# Patient Record
Sex: MALE | Race: WHITE | NOT HISPANIC OR LATINO | Employment: OTHER | URBAN - METROPOLITAN AREA
[De-identification: names, ages, dates, MRNs, and addresses within clinical notes are randomized per-mention and may not be internally consistent; named-entity substitution may affect disease eponyms.]

---

## 2018-08-29 ENCOUNTER — TELEPHONE (OUTPATIENT)
Dept: GASTROENTEROLOGY | Facility: CLINIC | Age: 53
End: 2018-08-29

## 2018-09-03 ENCOUNTER — PREP FOR SURGERY (OUTPATIENT)
Dept: OTHER | Facility: HOSPITAL | Age: 53
End: 2018-09-03

## 2018-09-03 DIAGNOSIS — Z12.11 SCREENING FOR COLON CANCER: Primary | ICD-10-CM

## 2018-09-06 PROBLEM — Z12.11 SCREENING FOR COLON CANCER: Status: ACTIVE | Noted: 2018-09-06

## 2018-10-19 ENCOUNTER — ANESTHESIA (OUTPATIENT)
Dept: GASTROENTEROLOGY | Facility: HOSPITAL | Age: 53
End: 2018-10-19

## 2018-10-19 ENCOUNTER — ANESTHESIA EVENT (OUTPATIENT)
Dept: GASTROENTEROLOGY | Facility: HOSPITAL | Age: 53
End: 2018-10-19

## 2018-10-19 ENCOUNTER — HOSPITAL ENCOUNTER (OUTPATIENT)
Facility: HOSPITAL | Age: 53
Setting detail: HOSPITAL OUTPATIENT SURGERY
Discharge: HOME | End: 2018-10-19
Attending: INTERNAL MEDICINE | Admitting: INTERNAL MEDICINE

## 2018-10-19 VITALS
OXYGEN SATURATION: 96 % | HEIGHT: 74 IN | HEART RATE: 71 BPM | WEIGHT: 231.56 LBS | SYSTOLIC BLOOD PRESSURE: 114 MMHG | TEMPERATURE: 97.9 F | DIASTOLIC BLOOD PRESSURE: 56 MMHG | BODY MASS INDEX: 29.72 KG/M2 | RESPIRATION RATE: 18 BRPM

## 2018-10-19 DIAGNOSIS — Z12.11 SCREENING FOR COLON CANCER: ICD-10-CM

## 2018-10-19 LAB — GLUCOSE BLDC GLUCOMTR-MCNC: 95 MG/DL (ref 70–130)

## 2018-10-19 PROCEDURE — 82962 GLUCOSE BLOOD TEST: CPT

## 2018-10-19 PROCEDURE — 45385 COLONOSCOPY W/LESION REMOVAL: CPT | Performed by: INTERNAL MEDICINE

## 2018-10-19 PROCEDURE — 25010000002 PROPOFOL 10 MG/ML EMULSION: Performed by: ANESTHESIOLOGY

## 2018-10-19 PROCEDURE — 45380 COLONOSCOPY AND BIOPSY: CPT | Performed by: INTERNAL MEDICINE

## 2018-10-19 PROCEDURE — 88305 TISSUE EXAM BY PATHOLOGIST: CPT | Performed by: INTERNAL MEDICINE

## 2018-10-19 RX ORDER — SODIUM CHLORIDE, SODIUM LACTATE, POTASSIUM CHLORIDE, CALCIUM CHLORIDE 600; 310; 30; 20 MG/100ML; MG/100ML; MG/100ML; MG/100ML
1000 INJECTION, SOLUTION INTRAVENOUS CONTINUOUS
Status: DISCONTINUED | OUTPATIENT
Start: 2018-10-19 | End: 2018-10-19 | Stop reason: HOSPADM

## 2018-10-19 RX ORDER — PROPOFOL 10 MG/ML
VIAL (ML) INTRAVENOUS AS NEEDED
Status: DISCONTINUED | OUTPATIENT
Start: 2018-10-19 | End: 2018-10-19 | Stop reason: SURG

## 2018-10-19 RX ORDER — LIDOCAINE HYDROCHLORIDE 20 MG/ML
INJECTION, SOLUTION INFILTRATION; PERINEURAL AS NEEDED
Status: DISCONTINUED | OUTPATIENT
Start: 2018-10-19 | End: 2018-10-19 | Stop reason: SURG

## 2018-10-19 RX ADMIN — SODIUM CHLORIDE, POTASSIUM CHLORIDE, SODIUM LACTATE AND CALCIUM CHLORIDE 1000 ML: 600; 310; 30; 20 INJECTION, SOLUTION INTRAVENOUS at 08:39

## 2018-10-19 RX ADMIN — PROPOFOL 200 MG: 10 INJECTION, EMULSION INTRAVENOUS at 09:55

## 2018-10-19 RX ADMIN — LIDOCAINE HYDROCHLORIDE 100 MG: 20 INJECTION, SOLUTION INFILTRATION; PERINEURAL at 09:40

## 2018-10-19 RX ADMIN — PROPOFOL 200 MG: 10 INJECTION, EMULSION INTRAVENOUS at 09:40

## 2018-10-19 NOTE — H&P
Patient Care Team:  Fawad Gutierrez MD as PCP - General (Internal Medicine)  George Fletcher DPM as PCP - Claims Attributed    CHIEF COMPLAINT: Screening for Colon Cancer    HISTORY OF PRESENT ILLNESS:    No previous exam and no family history      Past Medical History:   Diagnosis Date   • Cerebral palsy (CMS/HCC)    • Diabetes mellitus (CMS/HCC)    • Hyperlipidemia    • Hypertension    • Seasonal allergies    • Thrombocytopenia (CMS/HCC)     mild, chronic, and stable   • Vitamin D deficiency      History reviewed. No pertinent surgical history.  Family History   Problem Relation Age of Onset   • Hemochromatosis Father      Social History   Substance Use Topics   • Smoking status: Never Smoker   • Smokeless tobacco: Not on file   • Alcohol use No     Prescriptions Prior to Admission   Medication Sig Dispense Refill Last Dose   • atorvastatin (LIPITOR) 20 MG tablet Take 20 mg by mouth daily.   10/19/2018 at Unknown time   • cholecalciferol (VITAMIN D3) 1000 UNITS tablet Take 1,000 Units by mouth daily.   10/18/2018 at Unknown time   • FLUoxetine (PROzac) 10 MG capsule Take 10 mg by mouth Daily.   10/18/2018 at Unknown time   • fluticasone (VERAMYST) 27.5 MCG/SPRAY nasal spray 2 sprays into each nostril daily.   10/19/2018 at Unknown time   • levocetirizine (XYZAL) 5 MG tablet Take 5 mg by mouth Every Evening.   10/18/2018 at Unknown time   • polyethylene glycol (MIRALAX) pack packet Take 17 g by mouth Daily. 14 each 0 10/18/2018 at Unknown time   • sodium phosphates (OSMOPREP) 1.102-0.398 g tablet tablet Starting at 3:00pm -4 tabs every 15 min for 5 doses (20 tabs), second dosing starting at 11:00pm 4 tabs every 15 min. For 2 doses (8 tabs) 28 tablet 0 10/18/2018 at Unknown time     Allergies:  Patient has no known allergies.    REVIEW OF SYSTEMS:  Please see the above history of present illness for pertinent positives and negatives.  The remainder of the patient's systems have been reviewed and are  "negative.     Vital Signs  Temp:  [98 °F (36.7 °C)] 98 °F (36.7 °C)  Heart Rate:  [82] 82  Resp:  [20] 20  BP: (142)/(80) 142/80    Flowsheet Rows      First Filed Value   Admission Height  188 cm (74\") Documented at 10/19/2018 0828   Admission Weight  105 kg (231 lb 9 oz) Documented at 10/19/2018 0811           Physical Exam:  Physical Exam   Constitutional: Patient appears well-developed and well-nourished and in no acute distress   HEENT:   Head: Normocephalic and atraumatic.   Eyes:  Pupils are equal, round, and reactive to light. EOM are intact. Sclera are anicteric and non-injected.  Mouth and Throat: Patient has moist mucous membranes. Oropharynx is clear of any erythema or exudate.     Neck: Neck supple. No JVD present. No thyromegaly present. No lymphadenopathy present.  Cardiovascular: Regular rate, regular rhythm, S1 normal and S2 normal.  Exam reveals no gallop and no friction rub.  No murmur heard.  Pulmonary/Chest: Lungs are clear to auscultation bilaterally. No respiratory distress. No wheezes. No rhonchi. No rales.   Abdominal: Soft. Bowel sounds are normal. No distension and no mass. There is no hepatosplenomegaly. There is no tenderness.   Musculoskeletal: Normal Muscle tone  Extremities: No edema. Pulses are palpable in all 4 extremities.  Neurological: Patient is alert and oriented to person, place, and time. Cranial nerves II-XII are grossly intact with no focal deficits.  Skin: Skin is warm. No rash noted. Nails show no clubbing.  No cyanosis or erythema.    Debilities/Disabilities Identified: None  Emotional Behavior: Appropriate     Results Review:    I reviewed the patient's new clinical results.  Lab Results (most recent)     Procedure Component Value Units Date/Time    POC Glucose Once [[id number]]  (Normal) Collected:  10/19/18 0840    Specimen:  Blood Updated:  10/19/18 0852     Glucose 95 mg/dL     Narrative:       Meter: [id number] : [id number] Brendon LUQUE RN          Imaging " Results (most recent)     None        reviewed    ECG/EMG Results (most recent)     None        reviewed    Assessment/Plan     Screening for Colon Cancer/ Colonoscopy    I discussed the patients findings and my recommendations with patient.     Manuel Clarke MD  10/19/18  9:29 AM    Time: 10 min prior to procedure.

## 2018-10-19 NOTE — BRIEF OP NOTE
COLONOSCOPY  Progress Note    Gavino Samaniego Jr.  10/19/2018    Pre-op Diagnosis:   Screening for colon cancer [Z12.11]       Post-Op Diagnosis Codes:     * Screening for colon cancer [Z12.11]     * Colon polyp [K63.5]    Procedure/CPT® Codes:      Procedure(s):  COLONOSCOPY TO CECUM AND INTO TERMINAL ILEUM WITH COLD BIOPSY POLYPECTOMIES AND COLD SNARE POLYPECTOMIES    Surgeon(s):  Manuel Clarke MD    Anesthesia: Monitor Anesthesia Care    Staff:   Endo Technician: Loni Felton  Endo Nurse: Corazon Tran RN    Estimated Blood Loss: none    Urine Voided: * No values recorded between 10/19/2018  9:33 AM and 10/19/2018 10:13 AM *    Specimens:                ID Type Source Tests Collected by Time   A : TRANSVERSE COLON POLYPS Polyp Large Intestine, Transverse Colon TISSUE PATHOLOGY EXAM VinceManuel nazario MD 10/19/2018 0947   B : CECAL POLYP Polyp Large Intestine, Cecum TISSUE PATHOLOGY EXAM Manuel Clarke MD 10/19/2018 0950   C : ASCENDING COLON POLYPS Polyp Large Intestine, Right / Ascending Colon TISSUE PATHOLOGY EXAM Manuel Clarke MD 10/19/2018 0955   D : DESCENDING COLON POLYPS Polyp Large Intestine, Left / Descending Colon TISSUE PATHOLOGY EXAM Manuel Clarke MD 10/19/2018 1001   E : SIGMOID COLON POLYPS Polyp Large Intestine, Sigmoid Colon TISSUE PATHOLOGY EXAM Manuel Clarke MD 10/19/2018 1005   F : RECTAL POLYPS Polyp Large Intestine, Rectum TISSUE PATHOLOGY EXAM Manuel Clarke MD 10/19/2018 1008         Drains:      Findings: Colon to TI good Prep  Polyps (13) Cold Snare/Biopsy    Complications: None      Manuel Clarke MD     Date: 10/19/2018  Time: 10:14 AM

## 2018-10-19 NOTE — ANESTHESIA POSTPROCEDURE EVALUATION
Patient: Gavino Samaniego Jr.    Procedure Summary     Date:  10/19/18 Room / Location:  [facility] ENDOSCOPY 4 / [facility] ENDOSCOPY    Anesthesia Start:  0935 Anesthesia Stop:  1019    Procedure:  COLONOSCOPY TO CECUM AND INTO TERMINAL ILEUM WITH COLD BIOPSY POLYPECTOMIES AND COLD SNARE POLYPECTOMIES (N/A ) Diagnosis:       Screening for colon cancer      Colon polyp      (Screening for colon cancer [Z12.11])    Surgeon:  Manuel Clarke MD Provider:  aPtti Harper MD    Anesthesia Type:  MAC ASA Status:  2          Anesthesia Type: MAC  Last vitals  BP   114/56 (10/19/18 1036)   Temp   36.6 °C (97.9 °F) (10/19/18 1036)   Pulse   71 (10/19/18 1036)   Resp   18 (10/19/18 1036)     SpO2   96 % (10/19/18 1036)     Post Anesthesia Care and Evaluation    Patient location during evaluation: bedside  Patient participation: complete - patient participated  Level of consciousness: awake and alert  Pain score: 0  Pain management: adequate  Airway patency: patent  Anesthetic complications: No anesthetic complications  PONV Status: none  Cardiovascular status: acceptable  Respiratory status: acceptable  Hydration status: acceptable  Post Neuraxial Block status: Motor and sensory function returned to baseline

## 2018-10-22 LAB
CYTO UR: NORMAL
LAB AP CASE REPORT: NORMAL
PATH REPORT.FINAL DX SPEC: NORMAL
PATH REPORT.GROSS SPEC: NORMAL

## 2019-07-04 ENCOUNTER — APPOINTMENT (OUTPATIENT)
Dept: GENERAL RADIOLOGY | Facility: HOSPITAL | Age: 54
End: 2019-07-04

## 2019-07-04 PROCEDURE — 73630 X-RAY EXAM OF FOOT: CPT | Performed by: GENERAL PRACTICE

## 2019-07-04 PROCEDURE — 73610 X-RAY EXAM OF ANKLE: CPT | Performed by: GENERAL PRACTICE

## 2021-09-29 ENCOUNTER — CONSULT (OUTPATIENT)
Dept: ONCOLOGY | Facility: CLINIC | Age: 56
End: 2021-09-29

## 2021-09-29 ENCOUNTER — APPOINTMENT (OUTPATIENT)
Dept: OTHER | Facility: HOSPITAL | Age: 56
End: 2021-09-29

## 2021-09-29 VITALS
OXYGEN SATURATION: 95 % | SYSTOLIC BLOOD PRESSURE: 105 MMHG | WEIGHT: 221.6 LBS | BODY MASS INDEX: 29.37 KG/M2 | DIASTOLIC BLOOD PRESSURE: 54 MMHG | HEART RATE: 79 BPM | TEMPERATURE: 97.7 F | HEIGHT: 73 IN | RESPIRATION RATE: 18 BRPM

## 2021-09-29 DIAGNOSIS — Z83.49 FAMILY HISTORY OF HEMOCHROMATOSIS: ICD-10-CM

## 2021-09-29 DIAGNOSIS — D70.9 NEUTROPENIA, UNSPECIFIED TYPE (HCC): ICD-10-CM

## 2021-09-29 DIAGNOSIS — D69.6 THROMBOCYTOPENIA (HCC): Primary | ICD-10-CM

## 2021-09-29 LAB
BASOPHILS # BLD AUTO: 0.01 10*3/MM3 (ref 0–0.2)
BASOPHILS NFR BLD AUTO: 0.3 % (ref 0–1.5)
DEPRECATED RDW RBC AUTO: 43.3 FL (ref 37–54)
EOSINOPHIL # BLD AUTO: 0.14 10*3/MM3 (ref 0–0.4)
EOSINOPHIL NFR BLD AUTO: 4.5 % (ref 0.3–6.2)
ERYTHROCYTE [DISTWIDTH] IN BLOOD BY AUTOMATED COUNT: 13.2 % (ref 12.3–15.4)
FERRITIN SERPL-MCNC: 225 NG/ML (ref 30–400)
FOLATE SERPL-MCNC: 10.12 NG/ML (ref 4.78–24.2)
HCT VFR BLD AUTO: 42.8 % (ref 37.5–51)
HGB BLD-MCNC: 14.5 G/DL (ref 13–17.7)
IMM GRANULOCYTES # BLD AUTO: 0.01 10*3/MM3 (ref 0–0.05)
IMM GRANULOCYTES NFR BLD AUTO: 0.3 % (ref 0–0.5)
IRON 24H UR-MRATE: 74 MCG/DL (ref 59–158)
IRON SATN MFR SERPL: 23 % (ref 20–50)
LYMPHOCYTES # BLD AUTO: 0.99 10*3/MM3 (ref 0.7–3.1)
LYMPHOCYTES NFR BLD AUTO: 31.6 % (ref 19.6–45.3)
MCH RBC QN AUTO: 30.3 PG (ref 26.6–33)
MCHC RBC AUTO-ENTMCNC: 33.9 G/DL (ref 31.5–35.7)
MCV RBC AUTO: 89.5 FL (ref 79–97)
MONOCYTES # BLD AUTO: 0.36 10*3/MM3 (ref 0.1–0.9)
MONOCYTES NFR BLD AUTO: 11.5 % (ref 5–12)
NEUTROPHILS NFR BLD AUTO: 1.62 10*3/MM3 (ref 1.7–7)
NEUTROPHILS NFR BLD AUTO: 51.8 % (ref 42.7–76)
NRBC BLD AUTO-RTO: 0 /100 WBC (ref 0–0.2)
PLATELET # BLD AUTO: 80 10*3/MM3 (ref 140–450)
PLATELET # BLD AUTO: 80 10*3/MM3 (ref 140–450)
PLATELETS.RETICULATED NFR BLD AUTO: 2.1 % (ref 0.9–6.5)
PMV BLD AUTO: 10.1 FL (ref 6–12)
RBC # BLD AUTO: 4.78 10*6/MM3 (ref 4.14–5.8)
TIBC SERPL-MCNC: 319 MCG/DL (ref 298–536)
TRANSFERRIN SERPL-MCNC: 214 MG/DL (ref 200–360)
VIT B12 BLD-MCNC: 365 PG/ML (ref 211–946)
WBC # BLD AUTO: 3.13 10*3/MM3 (ref 3.4–10.8)

## 2021-09-29 PROCEDURE — 82728 ASSAY OF FERRITIN: CPT | Performed by: INTERNAL MEDICINE

## 2021-09-29 PROCEDURE — 85055 RETICULATED PLATELET ASSAY: CPT | Performed by: INTERNAL MEDICINE

## 2021-09-29 PROCEDURE — 84466 ASSAY OF TRANSFERRIN: CPT | Performed by: INTERNAL MEDICINE

## 2021-09-29 PROCEDURE — 99204 OFFICE O/P NEW MOD 45 MIN: CPT | Performed by: INTERNAL MEDICINE

## 2021-09-29 PROCEDURE — 36415 COLL VENOUS BLD VENIPUNCTURE: CPT | Performed by: INTERNAL MEDICINE

## 2021-09-29 PROCEDURE — 83921 ORGANIC ACID SINGLE QUANT: CPT | Performed by: INTERNAL MEDICINE

## 2021-09-29 PROCEDURE — 82746 ASSAY OF FOLIC ACID SERUM: CPT | Performed by: INTERNAL MEDICINE

## 2021-09-29 PROCEDURE — 82607 VITAMIN B-12: CPT | Performed by: INTERNAL MEDICINE

## 2021-09-29 PROCEDURE — 85025 COMPLETE CBC W/AUTO DIFF WBC: CPT | Performed by: INTERNAL MEDICINE

## 2021-09-29 PROCEDURE — 83540 ASSAY OF IRON: CPT | Performed by: INTERNAL MEDICINE

## 2021-09-29 RX ORDER — ACETAMINOPHEN 325 MG/1
650 TABLET ORAL EVERY 6 HOURS PRN
COMMUNITY

## 2021-09-29 RX ORDER — FLUTICASONE PROPIONATE 50 MCG
SPRAY, SUSPENSION (ML) NASAL
COMMUNITY
Start: 2021-09-20

## 2021-09-29 RX ORDER — BLOOD SUGAR DIAGNOSTIC
STRIP MISCELLANEOUS
COMMUNITY
Start: 2021-09-14

## 2021-09-29 RX ORDER — ALBUTEROL SULFATE 90 UG/1
2 AEROSOL, METERED RESPIRATORY (INHALATION) EVERY 4 HOURS PRN
COMMUNITY

## 2021-09-29 RX ORDER — LANCETS
EACH MISCELLANEOUS
COMMUNITY
Start: 2021-09-20

## 2021-09-29 NOTE — PROGRESS NOTES
Subjective     REASON FOR CONSULTATION:  Provide an opinion on any further workup or treatment on:    Thrombocytopenia                       REQUESTING PHYSICIAN: Bella Howard APRN    RECORDS OBTAINED: Records of the patients history including those obtained from the referring provider were reviewed and summarized in detail.    HISTORY OF PRESENT ILLNESS:    Gavino Samaniego is a 55 y.o. patient who was referred for evaluation of thrombocytopenia.  Patient has cerebral palsy.  He is not able to provide detailed history but he is accompanied by a staff from the long-term facility where he resides.    Patient was noted to have low platelet count of 97,000 on 11/6/2017.  CBC on 9/3/2020 revealed a platelet count of 94,000.  He had labs on 8/31/2021 with Bella Howard NP and was noted to have a drop in the platelets to 69,000.  He was therefore referred to our office for further evaluation.    Patient is not having problems with bleeding or bruising.  No bleeding from the nose or mouth.  No blood in the urine or stool.    Patient takes NSAIDs on rare occasions.  He is not on any aspirin.      REVIEW OF SYSTEMS:  Review of Systems   Constitutional: Negative for fatigue, fever and unexpected weight change.   HENT: Negative for nosebleeds and voice change.    Eyes: Negative for visual disturbance.   Respiratory: Negative for cough and shortness of breath.    Cardiovascular: Negative for chest pain and leg swelling.   Gastrointestinal: Negative for abdominal pain, blood in stool, constipation, diarrhea, nausea and vomiting.   Genitourinary: Negative for frequency and hematuria.   Musculoskeletal: Negative for back pain and joint swelling.   Skin: Negative for rash.   Neurological: Negative for dizziness and headaches.   Hematological: Negative for adenopathy. Does not bruise/bleed easily.   Psychiatric/Behavioral: Negative for dysphoric mood. The patient is not nervous/anxious.         Intellectual disability secondary to  cerebral palsy       Past Medical History:   Diagnosis Date   • Cerebral palsy (CMS/HCC)    • Colon polyp    • Diabetes mellitus (CMS/HCC)    • GERD (gastroesophageal reflux disease)    • H/O Heart murmur    • Hypercholesterolemia    • Hyperlipidemia    • Hypertension    • Intellectual disability    • Seasonal allergies    • Thrombocytopenia (CMS/HCC)     mild, chronic, and stable   • Vitamin D deficiency        Past Surgical History:   Procedure Laterality Date   • COLONOSCOPY N/A 10/19/2018    Procedure: COLONOSCOPY TO CECUM AND INTO TERMINAL ILEUM WITH COLD BIOPSY POLYPECTOMIES AND COLD SNARE POLYPECTOMIES;  Surgeon: Manuel Clarke MD;  Location: [facility] ENDOSCOPY;  Service: Gastroenterology       Social History     Socioeconomic History   • Marital status: Single     Spouse name: Not on file   • Number of children: Not on file   • Years of education: Not on file   • Highest education level: Not on file   Tobacco Use   • Smoking status: Never Smoker   • Smokeless tobacco: Never Used   Substance and Sexual Activity   • Alcohol use: No   • Sexual activity: Defer       Family History   Problem Relation Age of Onset   • Hemochromatosis Father         MEDICATIONS:    Current Outpatient Medications:   •  acetaminophen (TYLENOL) 325 MG tablet, Take 650 mg by mouth Every 6 (Six) Hours As Needed for Mild Pain ., Disp: , Rfl:   •  albuterol sulfate  (90 Base) MCG/ACT inhaler, Inhale 2 puffs Every 4 (Four) Hours As Needed for Wheezing., Disp: , Rfl:   •  atorvastatin (LIPITOR) 20 MG tablet, Take 20 mg by mouth daily., Disp: , Rfl:   •  Chlorcyclizine-Pseudoephed (STAHIST AD PO), Take  by mouth As Needed., Disp: , Rfl:   •  Dextromethorphan-guaiFENesin (DELSYM COUGH/CHEST CONGEST DM PO), Take  by mouth., Disp: , Rfl:   •  Dextrose, Diabetic Use, (Glucose) 1 g chewable tablet, Chew 4 g As Needed., Disp: , Rfl:   •  FLUoxetine (PROzac) 10 MG capsule, Take 10 mg by mouth Daily., Disp: , Rfl:   •  fluticasone  "(FLONASE) 50 MCG/ACT nasal spray, , Disp: , Rfl:   •  Lancets (onetouch ultrasoft) lancets, , Disp: , Rfl:   •  levocetirizine (XYZAL) 5 MG tablet, Take 5 mg by mouth Every Evening., Disp: , Rfl:   •  mupirocin (BACTROBAN) 2 % ointment, Apply 1 application topically to the appropriate area as directed 3 (Three) Times a Day., Disp: , Rfl:   •  OneTouch Ultra test strip, , Disp: , Rfl:      ALLERGIES:  No Known Allergies     Objective   VITAL SIGNS:  Vitals:    09/29/21 1338   BP: 105/54   Pulse: 79   Resp: 18   Temp: 97.7 °F (36.5 °C)   TempSrc: Temporal   SpO2: 95%   Weight: 101 kg (221 lb 9.6 oz)   Height: 186 cm (73.23\")  Comment: New Ht   PainSc: 0-No pain       Wt Readings from Last 3 Encounters:   09/29/21 101 kg (221 lb 9.6 oz)   10/19/18 105 kg (231 lb 9 oz)       PHYSICAL EXAMINATION  GENERAL:  The patient appears in good general condition, not in acute distress.  SKIN: No skin rashes. No ecchymosis.  Skin rash in the legs with changes of excoriation.  HEAD:  Normocephalic.  EYES:  No Jaundice. No Pallor. Pupils equal. EOMI.  NECK:  Supple with Good ROM. No Thyromegaly. No Masses.  LYMPHATICS:  No cervical or supraclavicular or axillary lymphadenopathy.  CHEST: Normal respiratory effort.   CARDIAC:   No edema.  ABDOMEN:  Soft. No tenderness. No Hepatomegaly. No Splenomegaly. No masses.  EXTREMITIES:  No clubbing. No deforming arthritis in the hands. No Calf tenderness.       RESULT REVIEW:   Results from last 7 days   Lab Units 09/29/21  1321   WBC 10*3/mm3 3.13*   NEUTROS ABS 10*3/mm3 1.62*   HEMOGLOBIN g/dL 14.5   HEMATOCRIT % 42.8   PLATELETS 10*3/mm3 80*  80*     I reviewed the peripheral blood smear from today.  RBCs have normal morphology.  WBCs have normal morphology.  No premature WBCs or blasts were identified.  Platelets were normal in size but were reduced in number.  No platelet clumping.    Component      Latest Ref Rng & Units 9/29/2021   IPF      0.90 - 6.50 % 2.10     Anemia labs:      Lab " 09/29/21  1321   FOLATE 10.12   VITAMIN B 12 365        Assessment/Plan   *Thrombocytopenia.  It dates back to at least 2017.  Platelet count was 97,000 on 11/7/2017.  Platelet count was 94,000 on 9/3/2020.  Platelet count decreased to 69,000 on 8/31/2021.  Labs obtained today revealed platelet count of 80,000.  There was no evidence of platelet clumping.  No increase in the immature platelet fraction.  Platelets were normal in size.  The thrombocytopenia is likely secondary to decreased platelet production based on IPF of 2.1%.  He has a low normal vitamin B12 level of 365 likely contributing.  Medication induced thrombocytopenia is not suspected based on his current medicines.  The chronicity of the condition argues against a bone marrow pathology like leukemia or lymphoma.    *Neutropenia.  This is also suspected of being secondary to vitamin B12 deficiency.  Medication induced neutropenia is unlikely.  He is not having problems with infections.    *Family history of hemochromatosis.  The medical record reports that the father has hemochromatosis.    PLAN:    1.  Start vitamin B12 1000 mcg daily.  2.  Obtain hemochromatosis gene test.  3.  Obtain ferritin iron panel today.  4.  Since his platelet count has stayed >50,000, he does not need treatment for the thrombocytopenia.  I explained that a higher platelet count would be needed if the patient needs surgery in the future.  If the patient is going to undergo a surgical procedure, we would recommend obtaining a CBC to make sure his platelet count is above at least 80,000.  5.  Follow-up in 6 months with repeat CBC IPF B12 and folate levels.        Amy Jansen MD  09/29/21

## 2021-09-30 ENCOUNTER — TELEPHONE (OUTPATIENT)
Dept: ONCOLOGY | Facility: CLINIC | Age: 56
End: 2021-09-30

## 2021-09-30 RX ORDER — LANOLIN ALCOHOL/MO/W.PET/CERES
1000 CREAM (GRAM) TOPICAL DAILY
Qty: 30 TABLET | Refills: 3 | Status: SHIPPED | OUTPATIENT
Start: 2021-09-30 | End: 2021-12-02 | Stop reason: SDUPTHER

## 2021-09-30 NOTE — TELEPHONE ENCOUNTER
----- Message from Amy Jansen MD sent at 9/29/2021  5:14 PM EDT -----  Vitamin B-12 level is low.  I recommend starting vitamin B12 1000 mcg daily.    Please contact the supportive staff at the patient's residence.  They provided the phone numbers in the health questionnaire form.    Thank you

## 2021-09-30 NOTE — TELEPHONE ENCOUNTER
Spoke with pts caregiver reviewing and instructing per Dr. Jansen's note, caregiver stated that they cannot give pt any medications (even OTC) that are not prescribed to him since he is/was a patient of [facility] and to use [facility] pharmacy. Caregiver v/u  RN sending b12 prescription to [facility] pharmacy

## 2021-10-05 LAB — HFE GENE MUT ANL BLD/T: NORMAL

## 2021-10-06 LAB
Lab: NORMAL
METHYLMALONATE SERPL-SCNC: 223 NMOL/L (ref 0–378)

## 2021-12-02 RX ORDER — LANOLIN ALCOHOL/MO/W.PET/CERES
1000 CREAM (GRAM) TOPICAL DAILY
Qty: 30 TABLET | Refills: 3 | Status: SHIPPED | OUTPATIENT
Start: 2021-12-02 | End: 2022-05-09

## 2022-04-27 ENCOUNTER — OFFICE VISIT (OUTPATIENT)
Dept: ONCOLOGY | Facility: CLINIC | Age: 57
End: 2022-04-27

## 2022-04-27 ENCOUNTER — LAB (OUTPATIENT)
Dept: OTHER | Facility: HOSPITAL | Age: 57
End: 2022-04-27

## 2022-04-27 VITALS
DIASTOLIC BLOOD PRESSURE: 67 MMHG | BODY MASS INDEX: 32.31 KG/M2 | WEIGHT: 243.8 LBS | OXYGEN SATURATION: 92 % | TEMPERATURE: 96.9 F | SYSTOLIC BLOOD PRESSURE: 135 MMHG | RESPIRATION RATE: 18 BRPM | HEART RATE: 82 BPM | HEIGHT: 73 IN

## 2022-04-27 DIAGNOSIS — D69.6 THROMBOCYTOPENIA: ICD-10-CM

## 2022-04-27 DIAGNOSIS — D70.9 NEUTROPENIA, UNSPECIFIED TYPE: ICD-10-CM

## 2022-04-27 DIAGNOSIS — Z14.8 HEMOCHROMATOSIS CARRIER: ICD-10-CM

## 2022-04-27 DIAGNOSIS — D69.6 THROMBOCYTOPENIA: Primary | ICD-10-CM

## 2022-04-27 LAB
BASOPHILS # BLD AUTO: 0.01 10*3/MM3 (ref 0–0.2)
BASOPHILS NFR BLD AUTO: 0.3 % (ref 0–1.5)
DEPRECATED RDW RBC AUTO: 42.7 FL (ref 37–54)
EOSINOPHIL # BLD AUTO: 0.11 10*3/MM3 (ref 0–0.4)
EOSINOPHIL NFR BLD AUTO: 3.3 % (ref 0.3–6.2)
ERYTHROCYTE [DISTWIDTH] IN BLOOD BY AUTOMATED COUNT: 13.2 % (ref 12.3–15.4)
FERRITIN SERPL-MCNC: 179.8 NG/ML (ref 30–400)
FOLATE SERPL-MCNC: >20 NG/ML (ref 4.78–24.2)
HCT VFR BLD AUTO: 44.7 % (ref 37.5–51)
HGB BLD-MCNC: 15.3 G/DL (ref 13–17.7)
IMM GRANULOCYTES # BLD AUTO: 0.01 10*3/MM3 (ref 0–0.05)
IMM GRANULOCYTES NFR BLD AUTO: 0.3 % (ref 0–0.5)
IRON 24H UR-MRATE: 75 MCG/DL (ref 59–158)
IRON SATN MFR SERPL: 22 % (ref 20–50)
LYMPHOCYTES # BLD AUTO: 0.93 10*3/MM3 (ref 0.7–3.1)
LYMPHOCYTES NFR BLD AUTO: 27.7 % (ref 19.6–45.3)
MCH RBC QN AUTO: 30.6 PG (ref 26.6–33)
MCHC RBC AUTO-ENTMCNC: 34.2 G/DL (ref 31.5–35.7)
MCV RBC AUTO: 89.4 FL (ref 79–97)
MONOCYTES # BLD AUTO: 0.3 10*3/MM3 (ref 0.1–0.9)
MONOCYTES NFR BLD AUTO: 8.9 % (ref 5–12)
NEUTROPHILS NFR BLD AUTO: 2 10*3/MM3 (ref 1.7–7)
NEUTROPHILS NFR BLD AUTO: 59.5 % (ref 42.7–76)
NRBC BLD AUTO-RTO: 0 /100 WBC (ref 0–0.2)
PLATELET # BLD AUTO: 83 10*3/MM3 (ref 140–450)
PLATELET # BLD AUTO: 83 10*3/MM3 (ref 140–450)
PLATELETS.RETICULATED NFR BLD AUTO: 2.8 % (ref 0.9–6.5)
PMV BLD AUTO: 9.8 FL (ref 6–12)
RBC # BLD AUTO: 5 10*6/MM3 (ref 4.14–5.8)
TIBC SERPL-MCNC: 344 MCG/DL (ref 298–536)
TRANSFERRIN SERPL-MCNC: 231 MG/DL (ref 200–360)
VIT B12 BLD-MCNC: 1245 PG/ML (ref 211–946)
WBC NRBC COR # BLD: 3.36 10*3/MM3 (ref 3.4–10.8)

## 2022-04-27 PROCEDURE — 84466 ASSAY OF TRANSFERRIN: CPT | Performed by: INTERNAL MEDICINE

## 2022-04-27 PROCEDURE — 82746 ASSAY OF FOLIC ACID SERUM: CPT | Performed by: INTERNAL MEDICINE

## 2022-04-27 PROCEDURE — 99214 OFFICE O/P EST MOD 30 MIN: CPT | Performed by: INTERNAL MEDICINE

## 2022-04-27 PROCEDURE — 82607 VITAMIN B-12: CPT | Performed by: INTERNAL MEDICINE

## 2022-04-27 PROCEDURE — 85055 RETICULATED PLATELET ASSAY: CPT | Performed by: INTERNAL MEDICINE

## 2022-04-27 PROCEDURE — 82728 ASSAY OF FERRITIN: CPT | Performed by: INTERNAL MEDICINE

## 2022-04-27 PROCEDURE — 85025 COMPLETE CBC W/AUTO DIFF WBC: CPT | Performed by: INTERNAL MEDICINE

## 2022-04-27 PROCEDURE — 36415 COLL VENOUS BLD VENIPUNCTURE: CPT

## 2022-04-27 PROCEDURE — 83540 ASSAY OF IRON: CPT | Performed by: INTERNAL MEDICINE

## 2022-04-27 NOTE — PROGRESS NOTES
Subjective     CHIEF COMPLAINT:      Chief Complaint   Patient presents with   • Follow-up     No concerns       HISTORY OF PRESENT ILLNESS:     Gavino Samaniego is a 56 y.o. male patient who returns today for follow up on his thrombocytopenia and leukopenia.  He returns today for follow-up reporting no complaints today.  He is taking vitamin B12 regularly.  He is not having problems with bleeding or bruising.  No recent infections.    ROS:  Pertinent ROS is in the HPI.     Past medical, surgical, social and family history were reviewed.     MEDICATIONS:    Current Outpatient Medications:   •  acetaminophen (TYLENOL) 325 MG tablet, Take 650 mg by mouth Every 6 (Six) Hours As Needed for Mild Pain ., Disp: , Rfl:   •  albuterol sulfate  (90 Base) MCG/ACT inhaler, Inhale 2 puffs Every 4 (Four) Hours As Needed for Wheezing., Disp: , Rfl:   •  atorvastatin (LIPITOR) 20 MG tablet, Take 20 mg by mouth daily., Disp: , Rfl:   •  Chlorcyclizine-Pseudoephed (STAHIST AD PO), Take  by mouth As Needed., Disp: , Rfl:   •  Dextromethorphan-guaiFENesin (DELSYM COUGH/CHEST CONGEST DM PO), Take  by mouth., Disp: , Rfl:   •  Dextrose, Diabetic Use, (Glucose) 1 g chewable tablet, Chew 4 g As Needed., Disp: , Rfl:   •  FLUoxetine (PROzac) 10 MG capsule, Take 10 mg by mouth Daily., Disp: , Rfl:   •  fluticasone (FLONASE) 50 MCG/ACT nasal spray, , Disp: , Rfl:   •  Lancets (onetouch ultrasoft) lancets, , Disp: , Rfl:   •  levocetirizine (XYZAL) 5 MG tablet, Take 5 mg by mouth Every Evening., Disp: , Rfl:   •  mupirocin (BACTROBAN) 2 % ointment, Apply 1 application topically to the appropriate area as directed 3 (Three) Times a Day., Disp: , Rfl:   •  OneTouch Ultra test strip, , Disp: , Rfl:   •  vitamin B-12 (CYANOCOBALAMIN) 1000 MCG tablet, Take 1 tablet by mouth Daily., Disp: 30 tablet, Rfl: 3    Objective   VITAL SIGNS:     Vitals:    04/27/22 0840   BP: 135/67   Pulse: 82   Resp: 18   Temp: 96.9 °F (36.1 °C)   TempSrc:  "Temporal   SpO2: 92%   Weight: 111 kg (243 lb 12.8 oz)   Height: 186 cm (73.23\")   PainSc: 0-No pain     Body mass index is 31.97 kg/m².     Wt Readings from Last 5 Encounters:   04/27/22 111 kg (243 lb 12.8 oz)   09/29/21 101 kg (221 lb 9.6 oz)   10/19/18 105 kg (231 lb 9 oz)     PHYSICAL EXAMINATION:   GENERAL: The patient appears in good general condition, not in acute distress.   SKIN: No ecchymosis.  No petechiae.  EYES: No jaundice. No pallor.  LYMPHATICS: No cervical, supraclavicular or axillary lymphadenopathy.  ABDOMEN: Soft. No tenderness. No Hepatomegaly. No Splenomegaly. No masses.    DIAGNOSTIC DATA:     Results from last 7 days   Lab Units 04/27/22  0830   WBC 10*3/mm3 3.36*   NEUTROS ABS 10*3/mm3 2.00   HEMOGLOBIN g/dL 15.3   HEMATOCRIT % 44.7   PLATELETS 10*3/mm3 83*  83*         Lab 04/27/22  0830   IRON 75   IRON SATURATION 22   TIBC 344   TRANSFERRIN 231   FERRITIN 179.80   FOLATE >20.00   VITAMIN B 12 1,245*      Hemochromatosis gene testing 9/29/2021:  Hemochromatosis Gene Comment    Comment: Result: CARRIER   Single mutation (C282Y) identified      Assessment/Plan   *Thrombocytopenia.    · It dates back to 2017.  Platelet count was 97,000 on 11/7/2017.    · Platelet count was 94,000 on 9/3/2020.    · Platelet count decreased to 69,000 on 8/31/2021.    · Platelet count was 80,000 on 9/29/2021.    · No evidence of platelet clumping.    · No increase in the immature platelet fraction.  · He had a low normal vitamin B12 level of 365 likely contributing to the thrombocytopenia.    · Medication induced thrombocytopenia is not suspected based on his current medicines.    · The chronicity of the condition argues against a bone marrow pathology like leukemia or lymphoma.  · Patient was started on vitamin B12 1000 mcg daily on 9/29/2021.  · Platelet count slightly improved to 83,000 today.  · He is not having problems with bleeding.    *Neutropenia.    · This was also suspected of being secondary to " vitamin B12 deficiency.    · Medication induced neutropenia was considered unlikely.    · No problem with recurrent infections.  · WBC increased to 3360 today with a neutrophil count of 2000.    *Hereditary hemochromatosis.    · Patient's father father has hemochromatosis.  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  · Ferritin was 225 and transferrin saturation was 23% on 9/29/2021.  · Ferritin decreased to 179.  Transferrin saturation is at 22%.    · He does not appear to have the phenotype of hereditary hemochromatosis.    PLAN:    1.  Continue vitamin B12 1000 mcg daily.    2.  Patient does not need any intervention for the low platelets at this point.    3.  Follow-up in 6 months with CBC ferritin iron panel and B12 levels.        Amy Jansen MD  04/27/22

## 2022-05-09 RX ORDER — LANOLIN ALCOHOL/MO/W.PET/CERES
CREAM (GRAM) TOPICAL
Qty: 31 TABLET | Refills: 2 | Status: SHIPPED | OUTPATIENT
Start: 2022-05-09 | End: 2022-07-05 | Stop reason: SDUPTHER

## 2022-07-05 ENCOUNTER — TELEPHONE (OUTPATIENT)
Dept: ONCOLOGY | Facility: CLINIC | Age: 57
End: 2022-07-05

## 2022-07-05 RX ORDER — LANOLIN ALCOHOL/MO/W.PET/CERES
1000 CREAM (GRAM) TOPICAL DAILY
Qty: 30 TABLET | Refills: 2 | Status: SHIPPED | OUTPATIENT
Start: 2022-07-05 | End: 2022-09-02 | Stop reason: SDUPTHER

## 2022-09-02 ENCOUNTER — TELEPHONE (OUTPATIENT)
Dept: ONCOLOGY | Facility: CLINIC | Age: 57
End: 2022-09-02

## 2022-09-02 RX ORDER — LANOLIN ALCOHOL/MO/W.PET/CERES
1000 CREAM (GRAM) TOPICAL DAILY
Qty: 30 TABLET | Refills: 2 | Status: SHIPPED | OUTPATIENT
Start: 2022-09-02 | End: 2022-11-29 | Stop reason: SDUPTHER

## 2022-10-26 ENCOUNTER — APPOINTMENT (OUTPATIENT)
Dept: OTHER | Facility: HOSPITAL | Age: 57
End: 2022-10-26

## 2022-11-09 ENCOUNTER — OFFICE VISIT (OUTPATIENT)
Dept: ONCOLOGY | Facility: CLINIC | Age: 57
End: 2022-11-09

## 2022-11-09 ENCOUNTER — LAB (OUTPATIENT)
Dept: OTHER | Facility: HOSPITAL | Age: 57
End: 2022-11-09

## 2022-11-09 VITALS
BODY MASS INDEX: 33.65 KG/M2 | RESPIRATION RATE: 18 BRPM | OXYGEN SATURATION: 97 % | SYSTOLIC BLOOD PRESSURE: 118 MMHG | HEART RATE: 71 BPM | TEMPERATURE: 96.9 F | DIASTOLIC BLOOD PRESSURE: 70 MMHG | WEIGHT: 253.9 LBS | HEIGHT: 73 IN

## 2022-11-09 DIAGNOSIS — D69.6 THROMBOCYTOPENIA: ICD-10-CM

## 2022-11-09 DIAGNOSIS — D70.9 NEUTROPENIA, UNSPECIFIED TYPE: ICD-10-CM

## 2022-11-09 DIAGNOSIS — Z14.8 HEMOCHROMATOSIS CARRIER: ICD-10-CM

## 2022-11-09 DIAGNOSIS — D69.6 THROMBOCYTOPENIA: Primary | ICD-10-CM

## 2022-11-09 LAB
BASOPHILS # BLD AUTO: 0.01 10*3/MM3 (ref 0–0.2)
BASOPHILS NFR BLD AUTO: 0.2 % (ref 0–1.5)
DEPRECATED RDW RBC AUTO: 42.3 FL (ref 37–54)
EOSINOPHIL # BLD AUTO: 0.21 10*3/MM3 (ref 0–0.4)
EOSINOPHIL NFR BLD AUTO: 4.8 % (ref 0.3–6.2)
ERYTHROCYTE [DISTWIDTH] IN BLOOD BY AUTOMATED COUNT: 13 % (ref 12.3–15.4)
FERRITIN SERPL-MCNC: 199.7 NG/ML (ref 30–400)
HCT VFR BLD AUTO: 46.6 % (ref 37.5–51)
HGB BLD-MCNC: 15.6 G/DL (ref 13–17.7)
IMM GRANULOCYTES # BLD AUTO: 0.02 10*3/MM3 (ref 0–0.05)
IMM GRANULOCYTES NFR BLD AUTO: 0.5 % (ref 0–0.5)
IRON 24H UR-MRATE: 95 MCG/DL (ref 59–158)
IRON SATN MFR SERPL: 26 % (ref 20–50)
LYMPHOCYTES # BLD AUTO: 1.24 10*3/MM3 (ref 0.7–3.1)
LYMPHOCYTES NFR BLD AUTO: 28.2 % (ref 19.6–45.3)
MCH RBC QN AUTO: 30.1 PG (ref 26.6–33)
MCHC RBC AUTO-ENTMCNC: 33.5 G/DL (ref 31.5–35.7)
MCV RBC AUTO: 90 FL (ref 79–97)
MONOCYTES # BLD AUTO: 0.52 10*3/MM3 (ref 0.1–0.9)
MONOCYTES NFR BLD AUTO: 11.8 % (ref 5–12)
NEUTROPHILS NFR BLD AUTO: 2.4 10*3/MM3 (ref 1.7–7)
NEUTROPHILS NFR BLD AUTO: 54.5 % (ref 42.7–76)
NRBC BLD AUTO-RTO: 0 /100 WBC (ref 0–0.2)
PLAT MORPH BLD: NORMAL
PLATELET # BLD AUTO: 84 10*3/MM3 (ref 140–450)
PMV BLD AUTO: 9.3 FL (ref 6–12)
RBC # BLD AUTO: 5.18 10*6/MM3 (ref 4.14–5.8)
RBC MORPH BLD: NORMAL
TIBC SERPL-MCNC: 359 MCG/DL (ref 298–536)
TRANSFERRIN SERPL-MCNC: 241 MG/DL (ref 200–360)
VIT B12 BLD-MCNC: 1103 PG/ML (ref 211–946)
WBC MORPH BLD: NORMAL
WBC NRBC COR # BLD: 4.4 10*3/MM3 (ref 3.4–10.8)

## 2022-11-09 PROCEDURE — 85007 BL SMEAR W/DIFF WBC COUNT: CPT | Performed by: INTERNAL MEDICINE

## 2022-11-09 PROCEDURE — 36415 COLL VENOUS BLD VENIPUNCTURE: CPT

## 2022-11-09 PROCEDURE — 83540 ASSAY OF IRON: CPT | Performed by: INTERNAL MEDICINE

## 2022-11-09 PROCEDURE — 82728 ASSAY OF FERRITIN: CPT | Performed by: INTERNAL MEDICINE

## 2022-11-09 PROCEDURE — 99214 OFFICE O/P EST MOD 30 MIN: CPT | Performed by: INTERNAL MEDICINE

## 2022-11-09 PROCEDURE — 82607 VITAMIN B-12: CPT | Performed by: INTERNAL MEDICINE

## 2022-11-09 PROCEDURE — 84466 ASSAY OF TRANSFERRIN: CPT | Performed by: INTERNAL MEDICINE

## 2022-11-09 PROCEDURE — 85025 COMPLETE CBC W/AUTO DIFF WBC: CPT | Performed by: INTERNAL MEDICINE

## 2022-11-29 RX ORDER — LANOLIN ALCOHOL/MO/W.PET/CERES
1000 CREAM (GRAM) TOPICAL DAILY
Qty: 90 TABLET | Refills: 1 | Status: SHIPPED | OUTPATIENT
Start: 2022-11-29

## 2023-05-03 ENCOUNTER — OFFICE VISIT (OUTPATIENT)
Dept: ONCOLOGY | Facility: CLINIC | Age: 58
End: 2023-05-03
Payer: MEDICARE

## 2023-05-03 ENCOUNTER — LAB (OUTPATIENT)
Dept: OTHER | Facility: HOSPITAL | Age: 58
End: 2023-05-03
Payer: MEDICARE

## 2023-05-03 VITALS
SYSTOLIC BLOOD PRESSURE: 126 MMHG | DIASTOLIC BLOOD PRESSURE: 70 MMHG | HEART RATE: 84 BPM | WEIGHT: 275.4 LBS | RESPIRATION RATE: 18 BRPM | BODY MASS INDEX: 36.5 KG/M2 | TEMPERATURE: 97.5 F | HEIGHT: 73 IN | OXYGEN SATURATION: 95 %

## 2023-05-03 DIAGNOSIS — Z14.8 HEMOCHROMATOSIS CARRIER: ICD-10-CM

## 2023-05-03 DIAGNOSIS — D70.9 NEUTROPENIA, UNSPECIFIED TYPE: ICD-10-CM

## 2023-05-03 DIAGNOSIS — D69.6 THROMBOCYTOPENIA: ICD-10-CM

## 2023-05-03 DIAGNOSIS — D69.6 THROMBOCYTOPENIA: Primary | ICD-10-CM

## 2023-05-03 LAB
BASOPHILS # BLD AUTO: 0.01 10*3/MM3 (ref 0–0.2)
BASOPHILS NFR BLD AUTO: 0.2 % (ref 0–1.5)
DEPRECATED RDW RBC AUTO: 43 FL (ref 37–54)
EOSINOPHIL # BLD AUTO: 0.19 10*3/MM3 (ref 0–0.4)
EOSINOPHIL NFR BLD AUTO: 4.4 % (ref 0.3–6.2)
ERYTHROCYTE [DISTWIDTH] IN BLOOD BY AUTOMATED COUNT: 13.2 % (ref 12.3–15.4)
FERRITIN SERPL-MCNC: 132.7 NG/ML (ref 30–400)
FOLATE SERPL-MCNC: >20 NG/ML (ref 4.78–24.2)
HCT VFR BLD AUTO: 44.8 % (ref 37.5–51)
HGB BLD-MCNC: 15 G/DL (ref 13–17.7)
IMM GRANULOCYTES # BLD AUTO: 0.01 10*3/MM3 (ref 0–0.05)
IMM GRANULOCYTES NFR BLD AUTO: 0.2 % (ref 0–0.5)
IRON 24H UR-MRATE: 93 MCG/DL (ref 59–158)
IRON SATN MFR SERPL: 27 % (ref 20–50)
LYMPHOCYTES # BLD AUTO: 1.14 10*3/MM3 (ref 0.7–3.1)
LYMPHOCYTES NFR BLD AUTO: 26.6 % (ref 19.6–45.3)
MCH RBC QN AUTO: 30.1 PG (ref 26.6–33)
MCHC RBC AUTO-ENTMCNC: 33.5 G/DL (ref 31.5–35.7)
MCV RBC AUTO: 89.8 FL (ref 79–97)
MONOCYTES # BLD AUTO: 0.49 10*3/MM3 (ref 0.1–0.9)
MONOCYTES NFR BLD AUTO: 11.4 % (ref 5–12)
NEUTROPHILS NFR BLD AUTO: 2.45 10*3/MM3 (ref 1.7–7)
NEUTROPHILS NFR BLD AUTO: 57.2 % (ref 42.7–76)
NRBC BLD AUTO-RTO: 0 /100 WBC (ref 0–0.2)
PLAT MORPH BLD: NORMAL
PLATELET # BLD AUTO: 90 10*3/MM3 (ref 140–450)
PMV BLD AUTO: 9.7 FL (ref 6–12)
RBC # BLD AUTO: 4.99 10*6/MM3 (ref 4.14–5.8)
RBC MORPH BLD: NORMAL
TIBC SERPL-MCNC: 346 MCG/DL (ref 298–536)
TRANSFERRIN SERPL-MCNC: 232 MG/DL (ref 200–360)
VIT B12 BLD-MCNC: 1255 PG/ML (ref 211–946)
WBC MORPH BLD: NORMAL
WBC NRBC COR # BLD: 4.29 10*3/MM3 (ref 3.4–10.8)

## 2023-05-03 PROCEDURE — 36415 COLL VENOUS BLD VENIPUNCTURE: CPT

## 2023-05-03 PROCEDURE — 82607 VITAMIN B-12: CPT | Performed by: INTERNAL MEDICINE

## 2023-05-03 PROCEDURE — 1159F MED LIST DOCD IN RCRD: CPT | Performed by: INTERNAL MEDICINE

## 2023-05-03 PROCEDURE — 99214 OFFICE O/P EST MOD 30 MIN: CPT | Performed by: INTERNAL MEDICINE

## 2023-05-03 PROCEDURE — 82728 ASSAY OF FERRITIN: CPT | Performed by: INTERNAL MEDICINE

## 2023-05-03 PROCEDURE — 83540 ASSAY OF IRON: CPT | Performed by: INTERNAL MEDICINE

## 2023-05-03 PROCEDURE — 1126F AMNT PAIN NOTED NONE PRSNT: CPT | Performed by: INTERNAL MEDICINE

## 2023-05-03 PROCEDURE — 1160F RVW MEDS BY RX/DR IN RCRD: CPT | Performed by: INTERNAL MEDICINE

## 2023-05-03 PROCEDURE — 84466 ASSAY OF TRANSFERRIN: CPT | Performed by: INTERNAL MEDICINE

## 2023-05-03 PROCEDURE — 85025 COMPLETE CBC W/AUTO DIFF WBC: CPT | Performed by: INTERNAL MEDICINE

## 2023-05-03 PROCEDURE — 82746 ASSAY OF FOLIC ACID SERUM: CPT | Performed by: INTERNAL MEDICINE

## 2023-05-03 PROCEDURE — 85007 BL SMEAR W/DIFF WBC COUNT: CPT | Performed by: INTERNAL MEDICINE

## 2023-05-03 NOTE — PROGRESS NOTES
Subjective     CHIEF COMPLAINT:      Chief Complaint   Patient presents with   • Follow-up     No concerns     HISTORY OF PRESENT ILLNESS:     Gavino Samaniego is a 57 y.o. male patient who returns today for follow up on his thrombocytopenia, neutropenia and carrier state of hereditary hemochromatosis.  He returns today for follow-up reporting no new symptoms.  No recent infections.  No problem with bleeding or bruising.    Patient is not having joint pain in his hands.  No morning stiffness.    Patient continues to take vitamin B12 once daily.    ROS:  Pertinent ROS is in the HPI.     Past medical, surgical, social and family history were reviewed.     MEDICATIONS:    Current Outpatient Medications:   •  acetaminophen (TYLENOL) 325 MG tablet, Take 2 tablets by mouth Every 6 (Six) Hours As Needed for Mild Pain., Disp: , Rfl:   •  albuterol sulfate  (90 Base) MCG/ACT inhaler, Inhale 2 puffs Every 4 (Four) Hours As Needed for Wheezing., Disp: , Rfl:   •  atorvastatin (LIPITOR) 20 MG tablet, Take 1 tablet by mouth Daily., Disp: , Rfl:   •  Chlorcyclizine-Pseudoephed (STAHIST AD PO), Take  by mouth As Needed., Disp: , Rfl:   •  Dextromethorphan-guaiFENesin (DELSYM COUGH/CHEST CONGEST DM PO), Take  by mouth., Disp: , Rfl:   •  Dextrose, Diabetic Use, (Glucose) 1 g chewable tablet, Chew 4 g As Needed., Disp: , Rfl:   •  FLUoxetine (PROzac) 10 MG capsule, Take 1 capsule by mouth Daily., Disp: , Rfl:   •  fluticasone (FLONASE) 50 MCG/ACT nasal spray, , Disp: , Rfl:   •  Lancets (onetouch ultrasoft) lancets, , Disp: , Rfl:   •  levocetirizine (XYZAL) 5 MG tablet, Take 1 tablet by mouth Every Evening., Disp: , Rfl:   •  mupirocin (BACTROBAN) 2 % ointment, Apply 1 application topically to the appropriate area as directed 3 (Three) Times a Day., Disp: , Rfl:   •  OneTouch Ultra test strip, , Disp: , Rfl:   •  vitamin B-12 (CYANOCOBALAMIN) 1000 MCG tablet, Take 1 tablet by mouth Daily., Disp: 90 tablet, Rfl:  "1  Objective     VITAL SIGNS:     Vitals:    05/03/23 0931   BP: 126/70   Pulse: 84   Resp: 18   Temp: 97.5 °F (36.4 °C)   TempSrc: Temporal   SpO2: 95%   Weight: 125 kg (275 lb 6.4 oz)   Height: 186 cm (73.23\")   PainSc: 0-No pain     Body mass index is 36.11 kg/m².     Wt Readings from Last 5 Encounters:   05/03/23 125 kg (275 lb 6.4 oz)   02/12/23 113 kg (250 lb)   11/09/22 115 kg (253 lb 14.4 oz)   04/27/22 111 kg (243 lb 12.8 oz)   09/29/21 101 kg (221 lb 9.6 oz)     PHYSICAL EXAMINATION:   GENERAL: The patient appears in good general condition, not in acute distress.   SKIN: No ecchymosis.  EYES: No jaundice. No pallor.  CHEST: Normal respiratory effort.   CVS: No edema.  ABDOMEN: Soft. No tenderness. No Hepatomegaly. No Splenomegaly. No masses.  EXTREMITIES: No arthritic changes in the hands..     DIAGNOSTIC DATA:     Results from last 7 days   Lab Units 05/03/23  0920   WBC 10*3/mm3 4.29   NEUTROS ABS 10*3/mm3 2.45   HEMOGLOBIN g/dL 15.0   HEMATOCRIT % 44.8   PLATELETS 10*3/mm3 90*         Lab 05/03/23  0920   IRON 93   IRON SATURATION 27   TIBC 346   TRANSFERRIN 232   FERRITIN 132.70   FOLATE >20.00   VITAMIN B 12 1,255*      Assessment & Plan    *Thrombocytopenia.    · It dates back to 2017.  Platelet count was 97,000 on 11/7/2017.    · Platelet count was 94,000 on 9/3/2020.    · Platelet count decreased to 69,000 on 8/31/2021.    · Platelet count was 80,000 on 9/29/2021.    · No evidence of platelet clumping.    · No increase in the immature platelet fraction.  · He had a low normal vitamin B12 level of 365.    · Patient was started on vitamin B12 1000 mcg daily on 9/29/2021.  · Platelet count slightly improved to 83,000 on 4/27/2022.  · Platelets were 84,000 on 11/9/2022.  · Vitamin B12 was 1103.  · Platelets improved to 90,000 today.  · He is not having problem with bleeding.    *Neutropenia.    · This was also suspected of being secondary to vitamin B12 deficiency.    · No problem with recurrent " infections.  · WBC increased to 3360 on 4/27/2022 with a neutrophil count of 2000.  · WBC further increased to 4400 on 11/9/2022 with neutrophil count of 2400.  · WBC is 4290 today.  Neutrophil count is 2450.  · No recent infections.    *Hereditary hemochromatosis carrier.    · Patient's father father has hemochromatosis.  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  · Ferritin was 225 and transferrin saturation was 23% on 9/29/2021.  · On 4/27/2022, ferritin decreased to 179.  Transferrin saturation was 22%.    · He does not appear to have the phenotype of hereditary hemochromatosis.  · 11/9/2022: Ferritin 199.  Transferrin saturation 26%.  · 5/3/2023: Ferritin 132.7.  Transferrin saturation 27%.  · Based on his levels, no evidence of iron overload.    PLAN:    1.  Continue vitamin B12 1000 mcg daily.  2.  Continue to avoid iron in multivitamins and supplements.  3.  Follow-up in February 2024 with CBC ferritin iron panel vitamin B12 and folate levels.          Amy Jansen MD  05/03/23

## 2023-05-04 RX ORDER — LANOLIN ALCOHOL/MO/W.PET/CERES
1000 CREAM (GRAM) TOPICAL DAILY
Qty: 90 TABLET | Refills: 2 | Status: SHIPPED | OUTPATIENT
Start: 2023-05-04

## 2024-01-03 RX ORDER — LANOLIN ALCOHOL/MO/W.PET/CERES
1000 CREAM (GRAM) TOPICAL DAILY
Qty: 90 TABLET | Refills: 2 | Status: SHIPPED | OUTPATIENT
Start: 2024-01-03

## 2024-01-03 NOTE — TELEPHONE ENCOUNTER
Caller: Augusto LANDRY ([facility]) - [place], [place] - [address]. - [phone] [facility] [phone] FX    Relationship: Pharmacy    Requested Prescriptions:   Requested Prescriptions     Pending Prescriptions Disp Refills    vitamin B-12 (CYANOCOBALAMIN) 1000 MCG tablet 90 tablet 2     Sig: Take 1 tablet by mouth Daily.        Pharmacy where request should be sent: AUGUSTO LANDRY ([facility]) - [place], [place] - [address]. - [phone] [facility] [phone] FX     Last office visit with prescribing clinician: 5/3/2023   Last telemedicine visit with prescribing clinician: Visit date not found   Next office visit with prescribing clinician: 2/21/2024     Does the patient have less than a 3 day supply:  [] Yes  [x] No    Would you like a call back once the refill request has been completed: [] Yes [x] No    If the office needs to give you a call back, can they leave a voicemail: [] Yes [x] No

## 2024-02-21 ENCOUNTER — LAB (OUTPATIENT)
Dept: OTHER | Facility: HOSPITAL | Age: 59
End: 2024-02-21
Payer: MEDICARE

## 2024-02-21 ENCOUNTER — OFFICE VISIT (OUTPATIENT)
Dept: ONCOLOGY | Facility: CLINIC | Age: 59
End: 2024-02-21
Payer: MEDICARE

## 2024-02-21 VITALS
DIASTOLIC BLOOD PRESSURE: 75 MMHG | SYSTOLIC BLOOD PRESSURE: 131 MMHG | WEIGHT: 278.4 LBS | TEMPERATURE: 98 F | BODY MASS INDEX: 36.9 KG/M2 | OXYGEN SATURATION: 94 % | RESPIRATION RATE: 16 BRPM | HEIGHT: 73 IN | HEART RATE: 88 BPM

## 2024-02-21 DIAGNOSIS — Z14.8 HEMOCHROMATOSIS CARRIER: ICD-10-CM

## 2024-02-21 DIAGNOSIS — D69.6 THROMBOCYTOPENIA: ICD-10-CM

## 2024-02-21 DIAGNOSIS — D70.9 NEUTROPENIA, UNSPECIFIED TYPE: ICD-10-CM

## 2024-02-21 DIAGNOSIS — D69.6 THROMBOCYTOPENIA: Primary | ICD-10-CM

## 2024-02-21 LAB
BASOPHILS # BLD AUTO: 0.01 10*3/MM3 (ref 0–0.2)
BASOPHILS NFR BLD AUTO: 0.3 % (ref 0–1.5)
DEPRECATED RDW RBC AUTO: 45 FL (ref 37–54)
EOSINOPHIL # BLD AUTO: 0.2 10*3/MM3 (ref 0–0.4)
EOSINOPHIL NFR BLD AUTO: 5 % (ref 0.3–6.2)
ERYTHROCYTE [DISTWIDTH] IN BLOOD BY AUTOMATED COUNT: 13.8 % (ref 12.3–15.4)
FERRITIN SERPL-MCNC: 140.9 NG/ML (ref 30–400)
FOLATE SERPL-MCNC: 19.3 NG/ML (ref 4.78–24.2)
HCT VFR BLD AUTO: 44 % (ref 37.5–51)
HGB BLD-MCNC: 14.6 G/DL (ref 13–17.7)
IMM GRANULOCYTES # BLD AUTO: 0.01 10*3/MM3 (ref 0–0.05)
IMM GRANULOCYTES NFR BLD AUTO: 0.3 % (ref 0–0.5)
IRON 24H UR-MRATE: 92 MCG/DL (ref 59–158)
IRON SATN MFR SERPL: 27 % (ref 20–50)
LYMPHOCYTES # BLD AUTO: 1.03 10*3/MM3 (ref 0.7–3.1)
LYMPHOCYTES NFR BLD AUTO: 25.9 % (ref 19.6–45.3)
MCH RBC QN AUTO: 29.9 PG (ref 26.6–33)
MCHC RBC AUTO-ENTMCNC: 33.2 G/DL (ref 31.5–35.7)
MCV RBC AUTO: 90.2 FL (ref 79–97)
MONOCYTES # BLD AUTO: 0.53 10*3/MM3 (ref 0.1–0.9)
MONOCYTES NFR BLD AUTO: 13.3 % (ref 5–12)
NEUTROPHILS NFR BLD AUTO: 2.2 10*3/MM3 (ref 1.7–7)
NEUTROPHILS NFR BLD AUTO: 55.2 % (ref 42.7–76)
NRBC BLD AUTO-RTO: 0 /100 WBC (ref 0–0.2)
PLATELET # BLD AUTO: 88 10*3/MM3 (ref 140–450)
PMV BLD AUTO: 9.5 FL (ref 6–12)
RBC # BLD AUTO: 4.88 10*6/MM3 (ref 4.14–5.8)
TIBC SERPL-MCNC: 346 MCG/DL (ref 298–536)
TRANSFERRIN SERPL-MCNC: 232 MG/DL (ref 200–360)
VIT B12 BLD-MCNC: 1121 PG/ML (ref 211–946)
WBC NRBC COR # BLD AUTO: 3.98 10*3/MM3 (ref 3.4–10.8)

## 2024-02-21 PROCEDURE — 99214 OFFICE O/P EST MOD 30 MIN: CPT | Performed by: INTERNAL MEDICINE

## 2024-02-21 PROCEDURE — 85025 COMPLETE CBC W/AUTO DIFF WBC: CPT | Performed by: INTERNAL MEDICINE

## 2024-02-21 PROCEDURE — 83540 ASSAY OF IRON: CPT | Performed by: INTERNAL MEDICINE

## 2024-02-21 PROCEDURE — 84466 ASSAY OF TRANSFERRIN: CPT | Performed by: INTERNAL MEDICINE

## 2024-02-21 PROCEDURE — 1159F MED LIST DOCD IN RCRD: CPT | Performed by: INTERNAL MEDICINE

## 2024-02-21 PROCEDURE — 1126F AMNT PAIN NOTED NONE PRSNT: CPT | Performed by: INTERNAL MEDICINE

## 2024-02-21 PROCEDURE — 82728 ASSAY OF FERRITIN: CPT | Performed by: INTERNAL MEDICINE

## 2024-02-21 PROCEDURE — 1160F RVW MEDS BY RX/DR IN RCRD: CPT | Performed by: INTERNAL MEDICINE

## 2024-02-21 PROCEDURE — 82746 ASSAY OF FOLIC ACID SERUM: CPT | Performed by: INTERNAL MEDICINE

## 2024-02-21 PROCEDURE — 36415 COLL VENOUS BLD VENIPUNCTURE: CPT

## 2024-02-21 PROCEDURE — 82607 VITAMIN B-12: CPT | Performed by: INTERNAL MEDICINE

## 2024-02-21 NOTE — PROGRESS NOTES
Subjective     CHIEF COMPLAINT:      Chief Complaint   Patient presents with    Follow-up     HISTORY OF PRESENT ILLNESS:     Gavino Samaniego is a 58 y.o. male patient who returns today for follow up on his thrombocytopenia and hereditary hemochromatosis.  He returns today for follow-up reporting no new symptoms.  No recent infections.  No problem with bleeding or bruising.  He is taking vitamin B12 daily regularly.    ROS:  Pertinent ROS is in the HPI.     Past medical, surgical, social and family history were reviewed.     MEDICATIONS:    Current Outpatient Medications:     acetaminophen (TYLENOL) 325 MG tablet, Take 2 tablets by mouth Every 6 (Six) Hours As Needed for Mild Pain., Disp: , Rfl:     albuterol sulfate  (90 Base) MCG/ACT inhaler, Inhale 2 puffs Every 4 (Four) Hours As Needed for Wheezing., Disp: , Rfl:     atorvastatin (LIPITOR) 20 MG tablet, Take 1 tablet by mouth Daily., Disp: , Rfl:     Chlorcyclizine-Pseudoephed (STAHIST AD PO), Take  by mouth As Needed., Disp: , Rfl:     Dextromethorphan-guaiFENesin (DELSYM COUGH/CHEST CONGEST DM PO), Take  by mouth., Disp: , Rfl:     Dextrose, Diabetic Use, (Glucose) 1 g chewable tablet, Chew 4 g As Needed., Disp: , Rfl:     FLUoxetine (PROzac) 10 MG capsule, Take 1 capsule by mouth Daily., Disp: , Rfl:     fluticasone (FLONASE) 50 MCG/ACT nasal spray, , Disp: , Rfl:     Lancets (onetouch ultrasoft) lancets, , Disp: , Rfl:     levocetirizine (XYZAL) 5 MG tablet, Take 1 tablet by mouth Every Evening., Disp: , Rfl:     mupirocin (BACTROBAN) 2 % ointment, Apply 1 Application topically to the appropriate area as directed 3 (Three) Times a Day., Disp: , Rfl:     OneTouch Ultra test strip, , Disp: , Rfl:     vitamin B-12 (CYANOCOBALAMIN) 1000 MCG tablet, Take 1 tablet by mouth Daily., Disp: 90 tablet, Rfl: 2    Objective     VITAL SIGNS:     Vitals:    02/21/24 1351   BP: 131/75   Pulse: 88   Resp: 16   Temp: 98 °F (36.7 °C)   TempSrc: Temporal   SpO2: 94%  "  Weight: 126 kg (278 lb 6.4 oz)   Height: 186 cm (73.23\")   PainSc: 0-No pain     Body mass index is 36.5 kg/m².     Wt Readings from Last 5 Encounters:   02/21/24 126 kg (278 lb 6.4 oz)   05/03/23 125 kg (275 lb 6.4 oz)   02/12/23 113 kg (250 lb)   11/09/22 115 kg (253 lb 14.4 oz)   04/27/22 111 kg (243 lb 12.8 oz)     PHYSICAL EXAMINATION:   GENERAL: The patient appears in good general condition, not in acute distress.   SKIN: No ecchymosis.  EYES: No jaundice. No Pallor.  CHEST: Normal respiratory effort.  Lungs clear bilaterally.  No added sounds.  CVS: Normal S1-S2.  No murmurs.  ABDOMEN: Soft. No tenderness. No Hepatomegaly. No Splenomegaly. No masses.  EXTREMITIES: No noted deformity.     DIAGNOSTIC DATA:     Results from last 7 days   Lab Units 02/21/24  1349   WBC 10*3/mm3 3.98   NEUTROS ABS 10*3/mm3 2.20   HEMOGLOBIN g/dL 14.6   HEMATOCRIT % 44.0   PLATELETS 10*3/mm3 88*         Lab 02/21/24  1349   IRON 92   IRON SATURATION (TSAT) 27   TIBC 346   TRANSFERRIN 232   FERRITIN 140.90   FOLATE 19.30   VITAMIN B 12 1,121*      Assessment & Plan    *Thrombocytopenia.    It dates back to 2017.  Platelet count was 97,000 on 11/7/2017.    Platelet count was 94,000 on 9/3/2020.    Platelet count decreased to 69,000 on 8/31/2021.    Platelet count was 80,000 on 9/29/2021.    No evidence of platelet clumping.    No increase in the immature platelet fraction.  He had a low normal vitamin B12 level of 365.    Patient was started on vitamin B12 1000 mcg daily on 9/29/2021.  Platelet count slightly improved to 83,000 on 4/27/2022.  Platelets were 84,000 on 11/9/2022.  Vitamin B12 was 1103.  5/3/2023: Platelets improved to 90,000.  2/21/2024: Platelet count slightly decreased to 88,000.  Vitamin B12 1121 indicating adequate placement.  He is not having problem with bleeding or bruising.    *Neutropenia.    This was also suspected of being secondary to vitamin B12 deficiency.    No problem with recurrent infections.  WBC " increased to 3360 on 4/27/2022 with a neutrophil count of 2000.  WBC further increased to 4400 on 11/9/2022 with neutrophil count of 2400.  5/3/2023: WBC was 4290.  Neutrophil count 2450.  2/21/2024: WBC decreased to 3980.  Neutrophil count at 2200.  No recent infections.      *Hereditary hemochromatosis carrier.    Patient's father father has hemochromatosis.  Hemochromatosis gene test on 9/29/2021 showed the patient to be a carrier of C282Y mutation.  Ferritin was 225 and transferrin saturation was 23% on 9/29/2021.  On 4/27/2022, ferritin decreased to 179.  Transferrin saturation was 22%.    He does not appear to have the phenotype of hereditary hemochromatosis.  11/9/2022: Ferritin 199.  Transferrin saturation 26%.  5/3/2023: Ferritin 132.7.  Transferrin saturation 27%.  2/21/2024: Ferritin 140.  Transferrin saturation 27%.    PLAN:    1.  Continue vitamin B12 1000 mcg daily.   2.  Avoid multivitamins with iron.   3.  Monitor for easy bruising/bleeding.   4.  Follow-up in 9 months.  We will obtain CBC ferritin iron panel vitamin B12 and folate levels.         Amy Jansen MD  02/21/24

## 2024-04-26 ENCOUNTER — OFFICE VISIT (OUTPATIENT)
Dept: GASTROENTEROLOGY | Facility: CLINIC | Age: 59
End: 2024-04-26
Payer: MEDICARE

## 2024-04-26 ENCOUNTER — LAB (OUTPATIENT)
Dept: LAB | Facility: HOSPITAL | Age: 59
End: 2024-04-26
Payer: MEDICARE

## 2024-04-26 VITALS
WEIGHT: 274.4 LBS | HEIGHT: 73 IN | DIASTOLIC BLOOD PRESSURE: 70 MMHG | SYSTOLIC BLOOD PRESSURE: 114 MMHG | BODY MASS INDEX: 36.37 KG/M2

## 2024-04-26 DIAGNOSIS — R11.0 NAUSEA: Primary | ICD-10-CM

## 2024-04-26 DIAGNOSIS — K58.2 IRRITABLE BOWEL SYNDROME WITH BOTH CONSTIPATION AND DIARRHEA: ICD-10-CM

## 2024-04-26 DIAGNOSIS — R11.0 NAUSEA: ICD-10-CM

## 2024-04-26 DIAGNOSIS — Z86.010 PERSONAL HISTORY OF COLONIC POLYPS: ICD-10-CM

## 2024-04-26 PROCEDURE — 83013 H PYLORI (C-13) BREATH: CPT

## 2024-04-26 RX ORDER — SACCHAROMYCES BOULARDII 250 MG
250 CAPSULE ORAL 2 TIMES DAILY
Qty: 60 CAPSULE | Refills: 3 | Status: SHIPPED | OUTPATIENT
Start: 2024-04-26

## 2024-04-26 RX ORDER — WHEAT DEXTRIN 3 G/4 G
1 POWDER IN PACKET (EA) ORAL DAILY
Qty: 30 EACH | Refills: 3 | Status: SHIPPED | OUTPATIENT
Start: 2024-04-26

## 2024-04-26 NOTE — PROGRESS NOTES
PATIENT INFORMATION  Gavino Samaniego       - 1965    CHIEF COMPLAINT  Chief Complaint   Patient presents with    Diarrhea    Fecal Incontinence       HISTORY OF PRESENT ILLNESS  Here today for evaluation    [facility]. Having issues with irritable bowel. Sensitivity to dairy, cut out milk and cheese. Bowels are moving every other day, sometimes hard, other times liquid, urgency sometimes which has led to incontinence. Has days where he is stuck in bathroom. No bleeding, does have some abdominal pain when needing bowel movement.    Nausea once a week for the last year or greater, no HB or indigesion, sometimes vomiting every couple weeks, morning time after eating. Can be bile or food, caregiver thinks may be more reflux.     Drinking almond milk, diet drinks, reports well controlled diabetes, flavored water.     10/19/2018 last colon with 9 adenomas    Diarrhea   Pertinent negatives include no abdominal pain or vomiting.       REVIEWED PERTINENT RESULTS/ LABS  Lab Results   Component Value Date    CASEREPORT  10/19/2018     Surgical Pathology Report                         Case: [id number]                                  Authorizing Provider:  Manuel Clarke        Collected:           10/19/2018 09:47 AM                                 MD Candice                                                                   Ordering Location:     [facility]  Received:            10/19/2018 11:08 AM                                 ENDO SUITES                                                                  Pathologist:           Clemente Mcintosh MD                                                       Specimens:   1) - Large Intestine, Transverse Colon, TRANSVERSE COLON POLYPS                                     2) - Large Intestine, Cecum, CECAL POLYP                                                            3) - Large Intestine, Right / Ascending Colon, ASCENDING COLON  POLYPS                               4) - Large Intestine, Left / Descending Colon, DESCENDING COLON POLYPS                              5) - Large Intestine, Sigmoid Colon, SIGMOID COLON POLYPS                                           6) - Large Intestine, Rectum, RECTAL POLYPS                                                FINALDX  10/19/2018     1. TRANSVERSE COLON, BIOPSY:   FRAGMENTS OF TUBULAR ADENOMA WITH LOW-GRADE DYSPLASIA.    2. CECUM, BIOPSY:   TUBULAR ADENOMA WITH LOW-GRADE DYSPLASIA.    3. RIGHT/ASCENDING COLON, BIOPSY:   FRAGMENTS OF TUBULAR ADENOMA WITH LOW-GRADE DYSPLASIA.    4. LEFT/DESCENDING COLON, BIOPSY:   FRAGMENTS OF TUBULAR ADENOMA WITH LOW-GRADE DYSPLASIA.    5. SIGMOID COLON, BIOPSY:   TUBULAR ADENOMA WITH LOW-GRADE DYSPLASIA.   FRAGMENTS OF HYPERPLASTIC COLONIC MUCOSA.    6. RECTUM, BIOPSY:   FRAGMENTS OF HYPERPLASTIC POLYP.    MEL/pkm    CPT CODES:  1. 33755  2. 80332  3. 48718  4. 20254  5. 58951  6. 46966         Lab Results   Component Value Date    HGB 14.6 02/21/2024    MCV 90.2 02/21/2024    PLT 88 (L) 02/21/2024    HGBA1C 4.8 04/06/2018    TRIG 104 04/06/2018    FERRITIN 140.90 02/21/2024    IRON 92 02/21/2024    TIBC 346 02/21/2024      No results found.    REVIEW OF SYSTEMS  Review of Systems   Constitutional: Negative.    HENT: Negative.     Eyes: Negative.    Respiratory: Negative.     Cardiovascular: Negative.    Gastrointestinal:  Positive for diarrhea and nausea. Negative for abdominal pain, constipation and vomiting.   Endocrine: Negative.    Genitourinary: Negative.    Musculoskeletal: Negative.    Skin: Negative.    Allergic/Immunologic: Negative.    Neurological:  Positive for light-headedness.   Hematological: Negative.    Psychiatric/Behavioral: Negative.           ACTIVE PROBLEMS  Patient Active Problem List    Diagnosis     Thrombocytopenia [D69.6]     Hemochromatosis carrier [Z14.8]     Screening for colon cancer [Z12.11]          PAST MEDICAL HISTORY  Past Medical  History:   Diagnosis Date    Cerebral palsy     Colon polyp     Diabetes mellitus     GERD (gastroesophageal reflux disease)     H/O Heart murmur     Hypercholesterolemia     Hyperlipidemia     Hypertension     Intellectual disability     Seasonal allergies     Thrombocytopenia     mild, chronic, and stable    Vitamin D deficiency          SURGICAL HISTORY  Past Surgical History:   Procedure Laterality Date    COLONOSCOPY N/A 10/19/2018    Procedure: COLONOSCOPY TO CECUM AND INTO TERMINAL ILEUM WITH COLD BIOPSY POLYPECTOMIES AND COLD SNARE POLYPECTOMIES;  Surgeon: Manuel Clarke MD;  Location: [facility] ENDOSCOPY;  Service: Gastroenterology         FAMILY HISTORY  Family History   Problem Relation Age of Onset    Hemochromatosis Father          SOCIAL HISTORY  Social History     Occupational History     Employer: DISABLED   Tobacco Use    Smoking status: Never    Smokeless tobacco: Never   Vaping Use    Vaping status: Never Used   Substance and Sexual Activity    Alcohol use: No    Drug use: Not on file    Sexual activity: Defer         CURRENT MEDICATIONS    Current Outpatient Medications:     acetaminophen (TYLENOL) 325 MG tablet, Take 2 tablets by mouth Every 6 (Six) Hours As Needed for Mild Pain., Disp: , Rfl:     albuterol sulfate  (90 Base) MCG/ACT inhaler, Inhale 2 puffs Every 4 (Four) Hours As Needed for Wheezing., Disp: , Rfl:     atorvastatin (LIPITOR) 20 MG tablet, Take 1 tablet by mouth Daily., Disp: , Rfl:     Chlorcyclizine-Pseudoephed (STAHIST AD PO), Take  by mouth As Needed., Disp: , Rfl:     Dextrose, Diabetic Use, (Glucose) 1 g chewable tablet, Chew 4 g As Needed., Disp: , Rfl:     FLUoxetine (PROzac) 10 MG capsule, Take 1 capsule by mouth Daily., Disp: , Rfl:     fluticasone (FLONASE) 50 MCG/ACT nasal spray, , Disp: , Rfl:     Lancets (onetouch ultrasoft) lancets, , Disp: , Rfl:     levocetirizine (XYZAL) 5 MG tablet, Take 1 tablet by mouth Every Evening., Disp: , Rfl:      "mupirocin (BACTROBAN) 2 % ointment, Apply 1 Application topically to the appropriate area as directed 3 (Three) Times a Day., Disp: , Rfl:     OneTouch Ultra test strip, , Disp: , Rfl:     vitamin B-12 (CYANOCOBALAMIN) 1000 MCG tablet, Take 1 tablet by mouth Daily., Disp: 90 tablet, Rfl: 2    saccharomyces boulardii (Florastor) 250 MG capsule, Take 1 capsule by mouth 2 (Two) Times a Day., Disp: 60 capsule, Rfl: 3    wheat dextrin (BENEFIBER ON THE GO) powder powder, Take 1 each by mouth Daily., Disp: 30 each, Rfl: 3    ALLERGIES  Patient has no known allergies.    VITALS  Vitals:    04/26/24 1109   BP: 114/70   BP Location: Left arm   Patient Position: Sitting   Cuff Size: Large Adult   Weight: 124 kg (274 lb 6.4 oz)   Height: 186 cm (73.23\")       PHYSICAL EXAM  Debilities/Disabilities Identified: None  Emotional Behavior: Appropriate  Wt Readings from Last 3 Encounters:   04/26/24 124 kg (274 lb 6.4 oz)   02/21/24 126 kg (278 lb 6.4 oz)   05/03/23 125 kg (275 lb 6.4 oz)     Ht Readings from Last 1 Encounters:   04/26/24 186 cm (73.23\")     Body mass index is 35.98 kg/m².  Physical Exam  Constitutional:       General: He is not in acute distress.     Appearance: Normal appearance. He is not ill-appearing.   HENT:      Head: Normocephalic and atraumatic.      Mouth/Throat:      Mouth: Mucous membranes are moist.      Pharynx: No posterior oropharyngeal erythema.   Eyes:      General: No scleral icterus.  Cardiovascular:      Rate and Rhythm: Normal rate and regular rhythm.      Heart sounds: Normal heart sounds.   Pulmonary:      Effort: Pulmonary effort is normal.      Breath sounds: Normal breath sounds.   Abdominal:      General: Abdomen is flat. Bowel sounds are normal. There is no distension.      Palpations: Abdomen is soft. There is no mass.      Tenderness: There is no abdominal tenderness. There is no guarding or rebound. Negative signs include Figueroa's sign.      Hernia: No hernia is present. "   Musculoskeletal:      Cervical back: Neck supple.   Skin:     General: Skin is warm.      Capillary Refill: Capillary refill takes less than 2 seconds.   Neurological:      General: No focal deficit present.      Mental Status: He is alert and oriented to person, place, and time.   Psychiatric:         Mood and Affect: Mood normal.         Behavior: Behavior normal.         Thought Content: Thought content normal.         Judgment: Judgment normal.         CLINICAL DATA REVIEWED   reviewed previous lab results and integrated with today's visit, reviewed notes from other physicians and/or last GI encounter, reviewed previous endoscopy results and available photos, reviewed surgical pathology results from previous biopsies    ASSESSMENT  Diagnoses and all orders for this visit:    Nausea  -     H. Pylori Breath Test - Breath, Lung; Future    Personal history of colonic polyps  -     Case Request; Standing  -     Case Request    Irritable bowel syndrome with both constipation and diarrhea  -     wheat dextrin (BENEFIBER ON THE GO) powder powder; Take 1 each by mouth Daily.  -     saccharomyces boulardii (Florastor) 250 MG capsule; Take 1 capsule by mouth 2 (Two) Times a Day.    Other orders  -     Follow Anesthesia Guidelines / Protocol; Future  -     Verify bowel prep was successful; Standing  -     Give tap water enema if bowel prep was insufficient; Standing  -     Obtain Informed Consent; Standing          PLAN    HP, then will send appropriate PPI dose  Daily fiber supplement, probiotic and fluids    Return in about 3 months (around 7/26/2024).    I have discussed the above plan with the patient.  They verbalize understanding and are in agreement with the plan.  They have been advised to contact the office for any questions, concerns, or changes related to their health.

## 2024-04-29 PROBLEM — Z86.010 PERSONAL HISTORY OF COLONIC POLYPS: Status: ACTIVE | Noted: 2024-04-26

## 2024-04-29 PROBLEM — Z86.0100 PERSONAL HISTORY OF COLONIC POLYPS: Status: ACTIVE | Noted: 2024-04-26

## 2024-04-29 LAB — UREA BREATH TEST QL: NEGATIVE

## 2024-04-29 RX ORDER — OMEPRAZOLE 40 MG/1
40 CAPSULE, DELAYED RELEASE ORAL DAILY
Qty: 90 CAPSULE | Refills: 3 | Status: SHIPPED | OUTPATIENT
Start: 2024-04-29

## 2024-05-03 ENCOUNTER — TELEPHONE (OUTPATIENT)
Dept: GASTROENTEROLOGY | Facility: CLINIC | Age: 59
End: 2024-05-03
Payer: MEDICARE

## 2024-05-03 NOTE — TELEPHONE ENCOUNTER
Waldo Parsons called to reschedule his colonoscopy on 06/24/2024.  She states short staff that day.    Scheduled at [facility] 07/03/2024 at 12:30pm - arrive 11:30am.  Will mail new instructions.

## 2024-06-07 ENCOUNTER — TELEPHONE (OUTPATIENT)
Dept: GASTROENTEROLOGY | Facility: CLINIC | Age: 59
End: 2024-06-07
Payer: MEDICARE

## 2024-06-07 RX ORDER — POLYETHYLENE GLYCOL 3350 17 G/17G
POWDER, FOR SOLUTION ORAL
Qty: 238 G | Refills: 0 | Status: SHIPPED | OUTPATIENT
Start: 2024-06-07

## 2024-06-07 NOTE — TELEPHONE ENCOUNTER
Annmarie Trujillo APRN Adler, Carla C  Done          Previous Messages       ----- Message -----  From: Meg Strange  Sent: 6/7/2024   1:35 PM EDT  To: AMISHA Escobedo  Subject: PREP MEDICATION                                  Prep day 07/02/2024    8am - 10am - take magnesium citrate    2pm - 4pm - 7 doses miralax with 32 oz gatorade     6pm - 8pm- 7 doses miralx with 32 oz gatorade

## 2024-06-07 NOTE — TELEPHONE ENCOUNTER
[facility] worker called about never received prep instructions.  Need to send prescription to [facility].    Explained mailed prep instructions 05/13/2024.  Verify address, which is correct.    Asked to email instructions.  Emailed today    [email or web address]    Message to AMISHA Oneill to send prep bowel medication to [facility].

## 2024-06-18 ENCOUNTER — TELEPHONE (OUTPATIENT)
Dept: GASTROENTEROLOGY | Facility: CLINIC | Age: 59
End: 2024-06-18

## 2024-06-18 NOTE — TELEPHONE ENCOUNTER
Caller: ESTHER SPENCE/ [facility]    Relationship to patient: Other    Best call back number: [phone]    Patient is needing: THEY WERE CALLING TO LET US KNOW THAT THEY RECEIVED MAIL FOR THE PATIENT AT [address] INSTEAD OF THE CORRECT ADDRESS THAT'S ON HIS CHART.  THEY SAID HIS CORRECT ADDRESS WAS ON THERE BUT SOMEONE HAD HAD HAND WRITTEN THE INCORRECT ADDRESS ON THE ENVELOPE. THANK YOU.

## 2024-06-28 ENCOUNTER — ANESTHESIA EVENT (OUTPATIENT)
Dept: PERIOP | Facility: HOSPITAL | Age: 59
End: 2024-06-28
Payer: MEDICARE

## 2024-07-03 ENCOUNTER — ANESTHESIA (OUTPATIENT)
Dept: PERIOP | Facility: HOSPITAL | Age: 59
End: 2024-07-03
Payer: MEDICARE

## 2024-07-03 ENCOUNTER — HOSPITAL ENCOUNTER (OUTPATIENT)
Facility: HOSPITAL | Age: 59
Setting detail: HOSPITAL OUTPATIENT SURGERY
Discharge: HOME | End: 2024-07-03
Attending: INTERNAL MEDICINE | Admitting: INTERNAL MEDICINE
Payer: MEDICARE

## 2024-07-03 VITALS
WEIGHT: 270.2 LBS | HEIGHT: 73 IN | HEART RATE: 78 BPM | TEMPERATURE: 97.6 F | BODY MASS INDEX: 35.81 KG/M2 | SYSTOLIC BLOOD PRESSURE: 117 MMHG | DIASTOLIC BLOOD PRESSURE: 70 MMHG | OXYGEN SATURATION: 96 % | RESPIRATION RATE: 18 BRPM

## 2024-07-03 DIAGNOSIS — Z86.010 PERSONAL HISTORY OF COLONIC POLYPS: ICD-10-CM

## 2024-07-03 LAB — GLUCOSE BLDC GLUCOMTR-MCNC: 78 MG/DL (ref 70–130)

## 2024-07-03 PROCEDURE — 25010000002 PROPOFOL 200 MG/20ML EMULSION: Performed by: NURSE ANESTHETIST, CERTIFIED REGISTERED

## 2024-07-03 PROCEDURE — 25810000003 LACTATED RINGERS PER 1000 ML: Performed by: NURSE ANESTHETIST, CERTIFIED REGISTERED

## 2024-07-03 PROCEDURE — 82948 REAGENT STRIP/BLOOD GLUCOSE: CPT

## 2024-07-03 PROCEDURE — 88305 TISSUE EXAM BY PATHOLOGIST: CPT | Performed by: INTERNAL MEDICINE

## 2024-07-03 RX ORDER — PROPOFOL 10 MG/ML
INJECTION, EMULSION INTRAVENOUS AS NEEDED
Status: DISCONTINUED | OUTPATIENT
Start: 2024-07-03 | End: 2024-07-03 | Stop reason: SURG

## 2024-07-03 RX ORDER — SODIUM CHLORIDE 9 MG/ML
40 INJECTION, SOLUTION INTRAVENOUS AS NEEDED
Status: DISCONTINUED | OUTPATIENT
Start: 2024-07-03 | End: 2024-07-03 | Stop reason: HOSPADM

## 2024-07-03 RX ORDER — LIDOCAINE HYDROCHLORIDE 20 MG/ML
INJECTION, SOLUTION INFILTRATION; PERINEURAL AS NEEDED
Status: DISCONTINUED | OUTPATIENT
Start: 2024-07-03 | End: 2024-07-03 | Stop reason: SURG

## 2024-07-03 RX ORDER — SODIUM CHLORIDE 0.9 % (FLUSH) 0.9 %
10 SYRINGE (ML) INJECTION EVERY 12 HOURS SCHEDULED
Status: DISCONTINUED | OUTPATIENT
Start: 2024-07-03 | End: 2024-07-03 | Stop reason: HOSPADM

## 2024-07-03 RX ORDER — DIPHENHYDRAMINE HYDROCHLORIDE 50 MG/ML
12.5 INJECTION INTRAMUSCULAR; INTRAVENOUS
Status: DISCONTINUED | OUTPATIENT
Start: 2024-07-03 | End: 2024-07-03 | Stop reason: HOSPADM

## 2024-07-03 RX ORDER — ONDANSETRON 2 MG/ML
4 INJECTION INTRAMUSCULAR; INTRAVENOUS ONCE AS NEEDED
Status: DISCONTINUED | OUTPATIENT
Start: 2024-07-03 | End: 2024-07-03 | Stop reason: HOSPADM

## 2024-07-03 RX ORDER — LIDOCAINE HYDROCHLORIDE 10 MG/ML
0.5 INJECTION, SOLUTION INFILTRATION; PERINEURAL ONCE AS NEEDED
Status: DISCONTINUED | OUTPATIENT
Start: 2024-07-03 | End: 2024-07-03 | Stop reason: HOSPADM

## 2024-07-03 RX ORDER — SODIUM CHLORIDE, SODIUM LACTATE, POTASSIUM CHLORIDE, CALCIUM CHLORIDE 600; 310; 30; 20 MG/100ML; MG/100ML; MG/100ML; MG/100ML
9 INJECTION, SOLUTION INTRAVENOUS CONTINUOUS
Status: DISCONTINUED | OUTPATIENT
Start: 2024-07-03 | End: 2024-07-03 | Stop reason: HOSPADM

## 2024-07-03 RX ORDER — SODIUM CHLORIDE, SODIUM LACTATE, POTASSIUM CHLORIDE, CALCIUM CHLORIDE 600; 310; 30; 20 MG/100ML; MG/100ML; MG/100ML; MG/100ML
100 INJECTION, SOLUTION INTRAVENOUS CONTINUOUS
Status: DISCONTINUED | OUTPATIENT
Start: 2024-07-03 | End: 2024-07-03 | Stop reason: HOSPADM

## 2024-07-03 RX ORDER — SODIUM CHLORIDE 0.9 % (FLUSH) 0.9 %
10 SYRINGE (ML) INJECTION AS NEEDED
Status: DISCONTINUED | OUTPATIENT
Start: 2024-07-03 | End: 2024-07-03 | Stop reason: HOSPADM

## 2024-07-03 RX ADMIN — SODIUM CHLORIDE, POTASSIUM CHLORIDE, SODIUM LACTATE AND CALCIUM CHLORIDE 9 ML/HR: 600; 310; 30; 20 INJECTION, SOLUTION INTRAVENOUS at 11:55

## 2024-07-03 RX ADMIN — PROPOFOL 50 MG: 10 INJECTION, EMULSION INTRAVENOUS at 12:55

## 2024-07-03 RX ADMIN — LIDOCAINE HYDROCHLORIDE 60 MG: 20 INJECTION, SOLUTION INFILTRATION; PERINEURAL at 12:51

## 2024-07-03 RX ADMIN — PROPOFOL 50 MG: 10 INJECTION, EMULSION INTRAVENOUS at 13:05

## 2024-07-03 RX ADMIN — PROPOFOL 100 MG: 10 INJECTION, EMULSION INTRAVENOUS at 12:51

## 2024-07-03 RX ADMIN — PROPOFOL 50 MG: 10 INJECTION, EMULSION INTRAVENOUS at 13:00

## 2024-07-03 NOTE — BRIEF OP NOTE
COLONOSCOPY WITH POLYPECTOMY  Progress Note    Gavino Samaniego  7/3/2024    Pre-op Diagnosis:   Personal history of colonic polyps [Z86.010]       Post-Op Diagnosis Codes:     * Personal history of colonic polyps [Z86.010]     * Colon polyp [K63.5]    Procedure/CPT® Codes:        Procedure(s):  COLONOSCOPY WITH POLYPECTOMY              Surgeon(s):  Manuel Clarke MD    Anesthesia: Monitored Anesthesia Care    Staff:   Circulator: Shan De Guzman RN  Scrub Person: Queta Sims         Estimated Blood Loss: none    Urine Voided: * No values recorded between 7/3/2024 12:38 PM and 7/3/2024  1:10 PM *    Specimens:                Specimens       ID Source Type Tests Collected By Collected At Frozen?    A Large Intestine, Sigmoid Colon Polyp TISSUE PATHOLOGY EXAM   Manuel Clarke MD 7/3/24 1303     Description: x3                  Drains: * No LDAs found *    Findings: Colon to Cecum Very Poor Prep  Polyps-3-Biopsy        Complications: None          Manuel Clarke MD     Date: 7/3/2024  Time: 13:14 EDT

## 2024-07-03 NOTE — ANESTHESIA PREPROCEDURE EVALUATION
Anesthesia Evaluation     Patient summary reviewed and Nursing notes reviewed   NPO Solid Status: > 8 hours  NPO Liquid Status: > 8 hours           Airway   Mallampati: II  TM distance: >3 FB  Neck ROM: full  Narrow palate and Possible difficult intubation  Dental - normal exam     Pulmonary - negative pulmonary ROS and normal exam   Cardiovascular - normal exam    Rhythm: regular  Rate: normal        Neuro/Psych- negative ROS  GI/Hepatic/Renal/Endo    (+) GERD well controlled, diabetes mellitus type 2 well controlled    Musculoskeletal (-) negative ROS    Abdominal    Substance History - negative use     OB/GYN          Other   blood dyscrasia (thrombocytopenia and hemachromatosis carrier) thrombocytopenia,         Phys Exam Other: underbite              Anesthesia Plan    ASA 2     MAC       Anesthetic plan, risks, benefits, and alternatives have been provided, discussed and informed consent has been obtained with: patient.    Use of blood products discussed with patient  Consented to blood products.    Plan discussed with CRNA.

## 2024-07-03 NOTE — ANESTHESIA POSTPROCEDURE EVALUATION
Patient: Gavino Samaniego    Procedure Summary       Date: 07/03/24 Room / Location: [facility] ENDOSCOPY 1 /  [facility]    Anesthesia Start: 1240 Anesthesia Stop: 1308    Procedure: COLONOSCOPY WITH POLYPECTOMY Diagnosis:       Personal history of colonic polyps      Colon polyp      (Personal history of colonic polyps [Z86.010])    Surgeons: Manuel Clarke MD Provider: Jasper Hart CRNA    Anesthesia Type: MAC ASA Status: 2            Anesthesia Type: MAC    Vitals  Vitals Value Taken Time   /70 07/03/24 1330   Temp 97.6 °F (36.4 °C) 07/03/24 1312   Pulse 74 07/03/24 1335   Resp 18 07/03/24 1330   SpO2 97 % 07/03/24 1335   Vitals shown include unfiled device data.        Post Anesthesia Care and Evaluation    Patient location during evaluation: PHASE II  Patient participation: complete - patient participated  Level of consciousness: awake and alert  Pain score: 0  Pain management: adequate    Airway patency: patent  Anesthetic complications: No anesthetic complications  PONV Status: none  Cardiovascular status: acceptable  Respiratory status: acceptable  Hydration status: acceptable

## 2024-07-03 NOTE — H&P
Patient Care Team:  Bella Howard APRN as PCP - General (Family Medicine)  Bella Howard APRN as Referring Physician (Family Medicine)  Amy Jansen MD as Consulting Physician (Hematology and Oncology)    CHIEF COMPLAINT: Personal hx colon polyps    HISTORY OF PRESENT ILLNESS:  Last exam was 2018 and 9/13 polyps were adenomas    Past Medical History:   Diagnosis Date    Cerebral palsy     Colon polyp     Diabetes mellitus     GERD (gastroesophageal reflux disease)     H/O Heart murmur     Hypercholesterolemia     Hyperlipidemia     Hypertension     Intellectual disability     Seasonal allergies     Thrombocytopenia     mild, chronic, and stable    Vitamin D deficiency      Past Surgical History:   Procedure Laterality Date    COLONOSCOPY N/A 10/19/2018    Procedure: COLONOSCOPY TO CECUM AND INTO TERMINAL ILEUM WITH COLD BIOPSY POLYPECTOMIES AND COLD SNARE POLYPECTOMIES;  Surgeon: Manuel Clarke MD;  Location: [facility] ENDOSCOPY;  Service: Gastroenterology     Family History   Problem Relation Age of Onset    Hemochromatosis Father      Social History     Tobacco Use    Smoking status: Never    Smokeless tobacco: Never   Vaping Use    Vaping status: Never Used   Substance Use Topics    Alcohol use: No     Medications Prior to Admission   Medication Sig Dispense Refill Last Dose    acetaminophen (TYLENOL) 325 MG tablet Take 2 tablets by mouth Every 6 (Six) Hours As Needed for Mild Pain.   Past Month    albuterol sulfate  (90 Base) MCG/ACT inhaler Inhale 2 puffs Every 4 (Four) Hours As Needed for Wheezing.   7/3/2024    atorvastatin (LIPITOR) 20 MG tablet Take 1 tablet by mouth Daily.   7/2/2024    Dextrose, Diabetic Use, (Glucose) 1 g chewable tablet Chew 4 g As Needed.   Past Month    FLUoxetine (PROzac) 10 MG capsule Take 1 capsule by mouth Daily.   7/3/2024    fluticasone (FLONASE) 50 MCG/ACT nasal spray    7/3/2024    magnesium citrate solution Drink 1 bottle 7/2/2024 at 0800. 296 mL 0  "7/3/2024    omeprazole (priLOSEC) 40 MG capsule Take 1 capsule by mouth Daily. 90 capsule 3 7/3/2024    vitamin B-12 (CYANOCOBALAMIN) 1000 MCG tablet Take 1 tablet by mouth Daily. 90 tablet 2 7/3/2024    wheat dextrin (BENEFIBER ON THE GO) powder powder Take 1 each by mouth Daily. 30 each 3 7/3/2024    Chlorcyclizine-Pseudoephed (STAHIST AD PO) Take  by mouth As Needed.   Unknown    Lancets (onetouch ultrasoft) lancets        levocetirizine (XYZAL) 5 MG tablet Take 1 tablet by mouth Every Evening.   Unknown    mupirocin (BACTROBAN) 2 % ointment Apply 1 Application topically to the appropriate area as directed 3 (Three) Times a Day.   Unknown    OneTouch Ultra test strip    Unknown    saccharomyces boulardii (Florastor) 250 MG capsule Take 1 capsule by mouth 2 (Two) Times a Day. 60 capsule 3 Unknown     Allergies:  Patient has no known allergies.    REVIEW OF SYSTEMS:  Please see the above history of present illness for pertinent positives and negatives.  The remainder of the patient's systems have been reviewed and are negative.     Vital Signs  Temp:  [97.5 °F (36.4 °C)] 97.5 °F (36.4 °C)  Heart Rate:  [82] 82  Resp:  [14] 14  BP: (135)/(74) 135/74    Flowsheet Rows      Flowsheet Row First Filed Value   Admission Height 185.4 cm (73\") Documented at 07/03/2024 1136   Admission Weight 123 kg (270 lb 3.2 oz) Documented at 07/03/2024 1136             Physical Exam:  Physical Exam   Constitutional: Patient appears well-developed and well-nourished and in no acute distress   HEENT:   Head: Normocephalic and atraumatic.   Eyes:  Pupils are equal, round, and reactive to light. EOM are intact. Sclerae are anicteric and non-injected.  Mouth and Throat: Patient has moist mucous membranes. Oropharynx is clear of any erythema or exudate.     Neck: Neck supple. No JVD present. No thyromegaly present. No lymphadenopathy present.  Cardiovascular: Regular rate, regular rhythm, S1 normal and S2 normal.  Exam reveals no gallop and " no friction rub.  No murmur heard.  Pulmonary/Chest: Lungs are clear to auscultation bilaterally. No respiratory distress. No wheezes. No rhonchi. No rales.   Abdominal: Soft. Bowel sounds are normal. No distension and no mass. There is no hepatosplenomegaly. There is no tenderness.   Musculoskeletal: Normal Muscle tone  Extremities: No edema. Pulses are palpable in all 4 extremities.  Neurological: Patient is alert and oriented to person, place, and time. Cranial nerves II-XII are grossly intact with no focal deficits.  Skin: Skin is warm. No rash noted. Nails show no clubbing.  No cyanosis or erythema.    Debilities/Disabilities Identified: None  Emotional Behavior: Appropriate     Results Review:   I reviewed the patient's new clinical results.    Lab Results (most recent)       Procedure Component Value Units Date/Time    POC Glucose Once [[id number]]  (Normal) Collected: 07/03/24 1156    Specimen: Blood Updated: 07/03/24 1203     Glucose 78 mg/dL             Imaging Results (Most Recent)       None          reviewed    ECG/EMG Results (most recent)       None          reviewed    Assessment & Plan   Personal hx colon polyps/  colonoscopy      I discussed the patient's findings and my recommendations with patient.     Manuel Clarke MD  07/03/24  12:46 EDT    Time: 10 min prior to procedure.

## 2024-07-05 LAB
LAB AP CASE REPORT: NORMAL
PATH REPORT.FINAL DX SPEC: NORMAL
PATH REPORT.GROSS SPEC: NORMAL

## 2024-07-26 ENCOUNTER — OFFICE VISIT (OUTPATIENT)
Dept: GASTROENTEROLOGY | Facility: CLINIC | Age: 59
End: 2024-07-26
Payer: MEDICARE

## 2024-07-26 VITALS
DIASTOLIC BLOOD PRESSURE: 50 MMHG | WEIGHT: 276.4 LBS | HEIGHT: 73 IN | SYSTOLIC BLOOD PRESSURE: 104 MMHG | BODY MASS INDEX: 36.63 KG/M2

## 2024-07-26 DIAGNOSIS — Z86.010 PERSONAL HISTORY OF COLONIC POLYPS: Primary | ICD-10-CM

## 2024-07-26 DIAGNOSIS — K58.2 IRRITABLE BOWEL SYNDROME WITH BOTH CONSTIPATION AND DIARRHEA: ICD-10-CM

## 2024-07-26 RX ORDER — POLYETHYLENE GLYCOL 3350 17 G/17G
17 POWDER, FOR SOLUTION ORAL NIGHTLY
Qty: 30 PACKET | Refills: 11 | Status: SHIPPED | OUTPATIENT
Start: 2024-07-26

## 2024-07-26 RX ORDER — BLOOD-GLUCOSE METER
EACH MISCELLANEOUS
COMMUNITY
Start: 2024-05-09

## 2024-07-26 NOTE — PROGRESS NOTES
PATIENT INFORMATION  Gavino Samaniego       - 1965    CHIEF COMPLAINT  Chief Complaint   Patient presents with    Irritable Bowel Syndrome       HISTORY OF PRESENT ILLNESS  Here today for follow-up    [place] resident.     IBS-D Per LOV: Having BM QOD, hard occasionally, can be liquid with urgency and incontinence, to clean out days. Reviewed daily fiber supplement. Scheduled colon. TODAY: Still having some accidents, staff considering behavioral concerns, avoiding lactose. Bowels are moving daily, a few times a day, every few days having hard stools, reports incontinence episodes once a day, stomach hurting. Stools are hard more often, pt cleaning self, so difficult to verify consistenty. Incomplete bowel prep supports constipation.     Per LOV: Nausea once a week, vomiting every few weeks bile or food. Caregiver felt to be reflux. HP breath test negative, so started omeprazole daily. TODAY: Doing well, denies any nausea and vomiting, no breakthrough symptoms.    Irritable Bowel Syndrome  Pertinent negatives include no abdominal pain, nausea or vomiting.       REVIEWED PERTINENT RESULTS/ LABS  Lab Results   Component Value Date    CASEREPORT  2024     Surgical Pathology Report                         Case: [id number]                                  Authorizing Provider:  Manuel Clarke        Collected:           2024 01:03 PM                                 MD Candice                                                                   Ordering Location:     [facility]   Received:            2024 01:56 PM                                 OR                                                                           Pathologist:           Ra Messer MD                                                         Specimen:    Large Intestine, Sigmoid Colon, x3                                                         FINALDX  2024     1.  Sigmoid colon polyps  X.3 biopsy:  A. Fragments of tubular adenoma and developing hyperplastic polyp.  B. No high-grade dysplasia nor malignancy identified.         Lab Results   Component Value Date    HGB 14.6 02/21/2024    MCV 90.2 02/21/2024    PLT 88 (L) 02/21/2024    HGBA1C 4.8 04/06/2018    TRIG 104 04/06/2018    FERRITIN 140.90 02/21/2024    IRON 92 02/21/2024    TIBC 346 02/21/2024      No results found.    REVIEW OF SYSTEMS  Review of Systems   Constitutional: Negative.    HENT: Negative.     Eyes: Negative.    Respiratory: Negative.     Cardiovascular: Negative.    Gastrointestinal:  Negative for abdominal pain, constipation, diarrhea, nausea and vomiting.        IBS, Phx polyps   Endocrine: Negative.    Genitourinary: Negative.    Musculoskeletal: Negative.    Skin: Negative.    Allergic/Immunologic: Negative.    Neurological: Negative.    Hematological: Negative.    Psychiatric/Behavioral: Negative.           ACTIVE PROBLEMS  Patient Active Problem List    Diagnosis     Personal history of colonic polyps [Z86.010]     Thrombocytopenia [D69.6]     Hemochromatosis carrier [Z14.8]     Screening for colon cancer [Z12.11]          PAST MEDICAL HISTORY  Past Medical History:   Diagnosis Date    Cerebral palsy     Colon polyp     Diabetes mellitus     GERD (gastroesophageal reflux disease)     H/O Heart murmur     Hypercholesterolemia     Hyperlipidemia     Hypertension     Intellectual disability     Seasonal allergies     Thrombocytopenia     mild, chronic, and stable    Vitamin D deficiency          SURGICAL HISTORY  Past Surgical History:   Procedure Laterality Date    COLONOSCOPY N/A 10/19/2018    Procedure: COLONOSCOPY TO CECUM AND INTO TERMINAL ILEUM WITH COLD BIOPSY POLYPECTOMIES AND COLD SNARE POLYPECTOMIES;  Surgeon: Manuel Clarke MD;  Location: [facility] ENDOSCOPY;  Service: Gastroenterology    COLONOSCOPY W/ POLYPECTOMY N/A 7/3/2024    Procedure: COLONOSCOPY WITH POLYPECTOMY;  Surgeon: Manuel Clarke  MD Candice;  Location: [facility];  Service: Gastroenterology;  Laterality: N/A;  poor prep, sigmoid polyp         FAMILY HISTORY  Family History   Problem Relation Age of Onset    Hemochromatosis Father          SOCIAL HISTORY  Social History     Occupational History     Employer: DISABLED   Tobacco Use    Smoking status: Never    Smokeless tobacco: Never   Vaping Use    Vaping status: Never Used   Substance and Sexual Activity    Alcohol use: No    Drug use: Not on file    Sexual activity: Defer         CURRENT MEDICATIONS    Current Outpatient Medications:     acetaminophen (TYLENOL) 325 MG tablet, Take 2 tablets by mouth Every 6 (Six) Hours As Needed for Mild Pain., Disp: , Rfl:     albuterol sulfate  (90 Base) MCG/ACT inhaler, Inhale 2 puffs Every 4 (Four) Hours As Needed for Wheezing., Disp: , Rfl:     atorvastatin (LIPITOR) 20 MG tablet, Take 1 tablet by mouth Daily., Disp: , Rfl:     Blood Glucose Monitoring Suppl (ONE TOUCH ULTRA 2) w/Device kit, , Disp: , Rfl:     Dextrose, Diabetic Use, (Glucose) 1 g chewable tablet, Chew 4 g As Needed., Disp: , Rfl:     Diclofenac Sodium (VOLTAREN) 1 % gel gel, Apply 4 g topically to the appropriate area as directed 4 (Four) Times a Day., Disp: , Rfl:     FLUoxetine (PROzac) 10 MG capsule, Take 1 capsule by mouth Daily., Disp: , Rfl:     fluticasone (FLONASE) 50 MCG/ACT nasal spray, , Disp: , Rfl:     Lancets (onetouch ultrasoft) lancets, , Disp: , Rfl:     levocetirizine (XYZAL) 5 MG tablet, Take 1 tablet by mouth Every Evening., Disp: , Rfl:     magnesium citrate solution, Drink 1 bottle 7/2/2024 at 0800., Disp: 296 mL, Rfl: 0    mupirocin (BACTROBAN) 2 % ointment, Apply 1 Application topically to the appropriate area as directed 3 (Three) Times a Day., Disp: , Rfl:     omeprazole (priLOSEC) 40 MG capsule, Take 1 capsule by mouth Daily., Disp: 90 capsule, Rfl: 3    OneTouch Ultra test strip, , Disp: , Rfl:     saccharomyces boulardii (Florastor) 250 MG capsule,  "Take 1 capsule by mouth 2 (Two) Times a Day., Disp: 60 capsule, Rfl: 3    vitamin B-12 (CYANOCOBALAMIN) 1000 MCG tablet, Take 1 tablet by mouth Daily., Disp: 90 tablet, Rfl: 2    wheat dextrin (BENEFIBER ON THE GO) powder powder, Take 1 each by mouth Daily., Disp: 30 each, Rfl: 3    polyethylene glycol (MIRALAX) 17 g packet, Take 17 g by mouth Every Night., Disp: 30 packet, Rfl: 11    ALLERGIES  Patient has no known allergies.    VITALS  Vitals:    07/26/24 1025   BP: 104/50   BP Location: Left arm   Patient Position: Sitting   Cuff Size: Large Adult   Weight: 125 kg (276 lb 6.4 oz)   Height: 185.4 cm (72.99\")       PHYSICAL EXAM  Debilities/Disabilities Identified: None  Emotional Behavior: Appropriate  Wt Readings from Last 3 Encounters:   07/26/24 125 kg (276 lb 6.4 oz)   07/03/24 123 kg (270 lb 3.2 oz)   04/26/24 124 kg (274 lb 6.4 oz)     Ht Readings from Last 1 Encounters:   07/26/24 185.4 cm (72.99\")     Body mass index is 36.47 kg/m².  Physical Exam  Constitutional:       General: He is not in acute distress.     Appearance: Normal appearance. He is not ill-appearing.   HENT:      Head: Normocephalic and atraumatic.      Mouth/Throat:      Mouth: Mucous membranes are moist.      Pharynx: No posterior oropharyngeal erythema.   Eyes:      General: No scleral icterus.  Cardiovascular:      Rate and Rhythm: Normal rate and regular rhythm.      Heart sounds: Normal heart sounds.   Pulmonary:      Effort: Pulmonary effort is normal.      Breath sounds: Normal breath sounds.   Abdominal:      General: Abdomen is flat. Bowel sounds are normal. There is no distension.      Palpations: Abdomen is soft. There is no mass.      Tenderness: There is abdominal tenderness in the right upper quadrant, epigastric area and left upper quadrant. There is no guarding or rebound. Negative signs include Figueroa's sign.      Hernia: No hernia is present.   Musculoskeletal:      Cervical back: Neck supple.   Skin:     General: Skin is " warm.      Capillary Refill: Capillary refill takes less than 2 seconds.   Neurological:      General: No focal deficit present.      Mental Status: He is alert and oriented to person, place, and time.   Psychiatric:         Mood and Affect: Mood normal.         Behavior: Behavior normal.         Thought Content: Thought content normal.         Judgment: Judgment normal.         CLINICAL DATA REVIEWED   reviewed previous lab results and integrated with today's visit, reviewed notes from other physicians and/or last GI encounter, reviewed previous endoscopy results and available photos, reviewed surgical pathology results from previous biopsies    ASSESSMENT  Diagnoses and all orders for this visit:    Personal history of colonic polyps    Irritable bowel syndrome with both constipation and diarrhea    Other orders  -     Blood Glucose Monitoring Suppl (ONE TOUCH ULTRA 2) w/Device kit  -     Diclofenac Sodium (VOLTAREN) 1 % gel gel; Apply 4 g topically to the appropriate area as directed 4 (Four) Times a Day.  -     polyethylene glycol (MIRALAX) 17 g packet; Take 17 g by mouth Every Night.          PLAN    Continue benefiber and probiotic  Add miralax at night, call if any issues and will adjust dose or plan  Continue daily pantoprazole    Return in about 3 months (around 10/26/2024).    I have discussed the above plan with the patient.  They verbalize understanding and are in agreement with the plan.  They have been advised to contact the office for any questions, concerns, or changes related to their health.

## 2024-09-06 RX ORDER — LANOLIN ALCOHOL/MO/W.PET/CERES
1000 CREAM (GRAM) TOPICAL DAILY
Qty: 90 TABLET | Refills: 2 | Status: SHIPPED | OUTPATIENT
Start: 2024-09-06

## 2024-10-18 ENCOUNTER — OFFICE VISIT (OUTPATIENT)
Dept: GASTROENTEROLOGY | Facility: CLINIC | Age: 59
End: 2024-10-18
Payer: MEDICARE

## 2024-10-18 VITALS
DIASTOLIC BLOOD PRESSURE: 70 MMHG | HEIGHT: 73 IN | SYSTOLIC BLOOD PRESSURE: 138 MMHG | BODY MASS INDEX: 36.95 KG/M2 | WEIGHT: 278.8 LBS

## 2024-10-18 DIAGNOSIS — K58.2 IRRITABLE BOWEL SYNDROME WITH BOTH CONSTIPATION AND DIARRHEA: Primary | ICD-10-CM

## 2024-10-18 DIAGNOSIS — Z86.0100 HISTORY OF COLONIC POLYPS: ICD-10-CM

## 2024-10-18 RX ORDER — POLYETHYLENE GLYCOL 3350 17 G/17G
17 POWDER, FOR SOLUTION ORAL DAILY PRN
Qty: 30 PACKET | Refills: 11 | Status: SHIPPED | OUTPATIENT
Start: 2024-10-18

## 2024-10-18 RX ORDER — WHEAT DEXTRIN 3 G/4 G
1 POWDER IN PACKET (EA) ORAL 2 TIMES DAILY
Qty: 60 EACH | Refills: 3 | Status: SHIPPED | OUTPATIENT
Start: 2024-10-18

## 2024-10-18 NOTE — PROGRESS NOTES
PATIENT INFORMATION  Gavino Samaniego       - 1965    CHIEF COMPLAINT  Chief Complaint   Patient presents with    Irritable Bowel Syndrome    Constipation    Diarrhea       HISTORY OF PRESENT ILLNESS  Here today for follow-up     [place] resident.      IBS: On daily benefiber at LOV, no longer skipping days, but occasional hard stools, several BM a day, some incontinence episodes, patient self disimpacting sometimes.    Bowels moving 3-4 times a day and usually urgent, incontinency every other day, but no longer having hard stools and having to disimpact. Will try to cut back to 1/2 dose every day.    GERD: Well controlled on daily omeprazole, no n/v/reflux          REVIEWED PERTINENT RESULTS/ LABS  Lab Results   Component Value Date    CASEREPORT  2024     Surgical Pathology Report                         Case: [id number]                                  Authorizing Provider:  Manuel Clarke        Collected:           2024 01:03 PM                                 MD Candice                                                                   Ordering Location:     [facility]   Received:            2024 01:56 PM                                 OR                                                                           Pathologist:           Ra Messer MD                                                         Specimen:    Large Intestine, Sigmoid Colon, x3                                                         FINALDX  2024     1.  Sigmoid colon polyps X.3 biopsy:  A. Fragments of tubular adenoma and developing hyperplastic polyp.  B. No high-grade dysplasia nor malignancy identified.         Lab Results   Component Value Date    HGB 14.6 2024    MCV 90.2 2024    PLT 88 (L) 2024    HGBA1C 4.8 2018    TRIG 104 2018    FERRITIN 140.90 2024    IRON 92 2024    TIBC 346 2024      No results found.    REVIEW  OF SYSTEMS  Review of Systems   Constitutional:  Negative for activity change, chills, fever and unexpected weight change.   HENT:  Negative for congestion.    Eyes:  Negative for visual disturbance.   Respiratory:  Negative for shortness of breath.    Cardiovascular:  Negative for chest pain and palpitations.   Gastrointestinal:  Positive for diarrhea. Negative for abdominal pain and blood in stool.        Reflux   Endocrine: Negative for cold intolerance and heat intolerance.   Genitourinary:  Negative for hematuria.   Musculoskeletal:  Negative for gait problem.   Skin:  Negative for color change.   Allergic/Immunologic: Negative for immunocompromised state.   Neurological:  Negative for weakness and light-headedness.   Hematological:  Negative for adenopathy.   Psychiatric/Behavioral:  Negative for sleep disturbance. The patient is not nervous/anxious.          ACTIVE PROBLEMS  Patient Active Problem List    Diagnosis     Personal history of colonic polyps [Z86.0100]     Thrombocytopenia [D69.6]     Hemochromatosis carrier [Z14.8]     Screening for colon cancer [Z12.11]          PAST MEDICAL HISTORY  Past Medical History:   Diagnosis Date    Cerebral palsy     Colon polyp     Diabetes mellitus     GERD (gastroesophageal reflux disease)     H/O Heart murmur     Hypercholesterolemia     Hyperlipidemia     Hypertension     Intellectual disability     Seasonal allergies     Thrombocytopenia     mild, chronic, and stable    Vitamin D deficiency          SURGICAL HISTORY  Past Surgical History:   Procedure Laterality Date    COLONOSCOPY N/A 10/19/2018    Procedure: COLONOSCOPY TO CECUM AND INTO TERMINAL ILEUM WITH COLD BIOPSY POLYPECTOMIES AND COLD SNARE POLYPECTOMIES;  Surgeon: Manuel Clarke MD;  Location: [facility] ENDOSCOPY;  Service: Gastroenterology    COLONOSCOPY W/ POLYPECTOMY N/A 7/3/2024    Procedure: COLONOSCOPY WITH POLYPECTOMY;  Surgeon: Manuel Clarke MD;  Location: [facility] OR;   Service: Gastroenterology;  Laterality: N/A;  poor prep, sigmoid polyp         FAMILY HISTORY  Family History   Problem Relation Age of Onset    Hemochromatosis Father          SOCIAL HISTORY  Social History     Occupational History     Employer: DISABLED   Tobacco Use    Smoking status: Never    Smokeless tobacco: Never   Vaping Use    Vaping status: Never Used   Substance and Sexual Activity    Alcohol use: No    Drug use: Not on file    Sexual activity: Defer         CURRENT MEDICATIONS    Current Outpatient Medications:     acetaminophen (TYLENOL) 325 MG tablet, Take 2 tablets by mouth Every 6 (Six) Hours As Needed for Mild Pain., Disp: , Rfl:     albuterol sulfate  (90 Base) MCG/ACT inhaler, Inhale 2 puffs Every 4 (Four) Hours As Needed for Wheezing., Disp: , Rfl:     atorvastatin (LIPITOR) 20 MG tablet, Take 1 tablet by mouth Daily., Disp: , Rfl:     Blood Glucose Monitoring Suppl (ONE TOUCH ULTRA 2) w/Device kit, , Disp: , Rfl:     Dextrose, Diabetic Use, (Glucose) 1 g chewable tablet, Chew 4 g As Needed., Disp: , Rfl:     Diclofenac Sodium (VOLTAREN) 1 % gel gel, Apply 4 g topically to the appropriate area as directed 4 (Four) Times a Day., Disp: , Rfl:     FLUoxetine (PROzac) 10 MG capsule, Take 1 capsule by mouth Daily., Disp: , Rfl:     fluticasone (FLONASE) 50 MCG/ACT nasal spray, , Disp: , Rfl:     Lancets (onetouch ultrasoft) lancets, , Disp: , Rfl:     levocetirizine (XYZAL) 5 MG tablet, Take 1 tablet by mouth Every Evening., Disp: , Rfl:     mupirocin (BACTROBAN) 2 % ointment, Apply 1 Application topically to the appropriate area as directed 3 (Three) Times a Day., Disp: , Rfl:     omeprazole (priLOSEC) 40 MG capsule, Take 1 capsule by mouth Daily., Disp: 90 capsule, Rfl: 3    OneTouch Ultra test strip, , Disp: , Rfl:     saccharomyces boulardii (Florastor) 250 MG capsule, Take 1 capsule by mouth 2 (Two) Times a Day., Disp: 60 capsule, Rfl: 3    vitamin B-12 (CYANOCOBALAMIN) 1000 MCG tablet,  "Take 1 tablet by mouth Daily., Disp: 90 tablet, Rfl: 2    polyethylene glycol (MIRALAX) 17 g packet, Take 17 g by mouth Daily As Needed (hard stools)., Disp: 30 packet, Rfl: 11    wheat dextrin (BENEFIBER ON THE GO) powder powder, Take 1 each by mouth 2 (Two) Times a Day., Disp: 60 each, Rfl: 3    ALLERGIES  Patient has no known allergies.    VITALS  Vitals:    10/18/24 0933   BP: 138/70   BP Location: Left arm   Patient Position: Sitting   Cuff Size: Adult   Weight: 126 kg (278 lb 12.8 oz)   Height: 185.4 cm (73\")       PHYSICAL EXAM  Debilities/Disabilities Identified: None  Emotional Behavior: Appropriate  Wt Readings from Last 3 Encounters:   10/18/24 126 kg (278 lb 12.8 oz)   07/26/24 125 kg (276 lb 6.4 oz)   07/03/24 123 kg (270 lb 3.2 oz)     Ht Readings from Last 1 Encounters:   10/18/24 185.4 cm (73\")     Body mass index is 36.78 kg/m².  Physical Exam  Constitutional:       General: He is not in acute distress.     Appearance: Normal appearance. He is not ill-appearing.   HENT:      Head: Normocephalic and atraumatic.      Mouth/Throat:      Mouth: Mucous membranes are moist.      Pharynx: No posterior oropharyngeal erythema.   Eyes:      General: No scleral icterus.  Cardiovascular:      Rate and Rhythm: Normal rate and regular rhythm.      Heart sounds: Normal heart sounds.   Pulmonary:      Effort: Pulmonary effort is normal.      Breath sounds: Normal breath sounds.   Abdominal:      General: Abdomen is flat. Bowel sounds are normal. There is no distension.      Palpations: Abdomen is soft. There is no mass.      Tenderness: There is no abdominal tenderness in the right upper quadrant and right lower quadrant. There is no guarding or rebound. Negative signs include Figueroa's sign.      Hernia: No hernia is present.   Musculoskeletal:      Cervical back: Neck supple.   Skin:     General: Skin is warm.      Capillary Refill: Capillary refill takes less than 2 seconds.   Neurological:      General: No focal " deficit present.      Mental Status: He is alert and oriented to person, place, and time.   Psychiatric:         Mood and Affect: Mood normal.         Behavior: Behavior normal.         Thought Content: Thought content normal.         Judgment: Judgment normal.         CLINICAL DATA REVIEWED   reviewed previous lab results and integrated with today's visit, reviewed notes from other physicians and/or last GI encounter, reviewed previous endoscopy results and available photos, reviewed surgical pathology results from previous biopsies    ASSESSMENT  Diagnoses and all orders for this visit:    Irritable bowel syndrome with both constipation and diarrhea  -     polyethylene glycol (MIRALAX) 17 g packet; Take 17 g by mouth Daily As Needed (hard stools).  -     wheat dextrin (BENEFIBER ON THE GO) powder powder; Take 1 each by mouth 2 (Two) Times a Day.    Personal history of colonic polyps          PLAN    IBS: Hold miralax and use PRN, increase fiber powder to BID  Call if worsening incontinence or return of hard stools    Return in about 3 months (around 1/18/2025).    I have discussed the above plan with the patient.  They verbalize understanding and are in agreement with the plan.  They have been advised to contact the office for any questions, concerns, or changes related to their health.

## 2024-10-29 NOTE — PROGRESS NOTES
Subjective     CHIEF COMPLAINT:      Chief Complaint   Patient presents with    Follow-up     HISTORY OF PRESENT ILLNESS:   This is a 58 year old male who returns for 9 month follow up for hemochromatosis carrier, thrombocytopenia, and neutropenia. He is doing great and does not any have new concerns. He denies recent infections, easy bruising or bleeding, fatigue, or headaches.     ROS:  Pertinent ROS is in the HPI.     Past medical, surgical, social and family history were reviewed.     MEDICATIONS:    Current Outpatient Medications:     acetaminophen (TYLENOL) 325 MG tablet, Take 2 tablets by mouth Every 6 (Six) Hours As Needed for Mild Pain., Disp: , Rfl:     albuterol sulfate  (90 Base) MCG/ACT inhaler, Inhale 2 puffs Every 4 (Four) Hours As Needed for Wheezing., Disp: , Rfl:     atorvastatin (LIPITOR) 20 MG tablet, Take 1 tablet by mouth Daily., Disp: , Rfl:     Blood Glucose Monitoring Suppl (ONE TOUCH ULTRA 2) w/Device kit, , Disp: , Rfl:     Dextrose, Diabetic Use, (Glucose) 1 g chewable tablet, Chew 4 g As Needed., Disp: , Rfl:     Diclofenac Sodium (VOLTAREN) 1 % gel gel, Apply 4 g topically to the appropriate area as directed 4 (Four) Times a Day., Disp: , Rfl:     FLUoxetine (PROzac) 10 MG capsule, Take 1 capsule by mouth Daily., Disp: , Rfl:     fluticasone (FLONASE) 50 MCG/ACT nasal spray, , Disp: , Rfl:     Lancets (onetouch ultrasoft) lancets, , Disp: , Rfl:     levocetirizine (XYZAL) 5 MG tablet, Take 1 tablet by mouth Every Evening., Disp: , Rfl:     mupirocin (BACTROBAN) 2 % ointment, Apply 1 Application topically to the appropriate area as directed 3 (Three) Times a Day., Disp: , Rfl:     omeprazole (priLOSEC) 40 MG capsule, Take 1 capsule by mouth Daily., Disp: 90 capsule, Rfl: 3    OneTouch Ultra test strip, , Disp: , Rfl:     polyethylene glycol (MIRALAX) 17 g packet, Take 17 g by mouth Daily As Needed (hard stools)., Disp: 30 packet, Rfl: 11    saccharomyces boulardii (Florastor) 250  "MG capsule, Take 1 capsule by mouth 2 (Two) Times a Day., Disp: 60 capsule, Rfl: 3    vitamin B-12 (CYANOCOBALAMIN) 1000 MCG tablet, Take 1 tablet by mouth Daily., Disp: 90 tablet, Rfl: 2    wheat dextrin (BENEFIBER ON THE GO) powder powder, Take 1 each by mouth 2 (Two) Times a Day., Disp: 60 each, Rfl: 3    Objective     VITAL SIGNS:     Vitals:    10/30/24 1407   BP: 135/73   Pulse: 80   Resp: 16   Temp: 97.3 °F (36.3 °C)   TempSrc: Oral   SpO2: 94%   Weight: 126 kg (277 lb 6.4 oz)   Height: 185 cm (72.84\")   PainSc: 0-No pain       Body mass index is 36.76 kg/m².     Wt Readings from Last 5 Encounters:   10/30/24 126 kg (277 lb 6.4 oz)   10/18/24 126 kg (278 lb 12.8 oz)   07/26/24 125 kg (276 lb 6.4 oz)   07/03/24 123 kg (270 lb 3.2 oz)   04/26/24 124 kg (274 lb 6.4 oz)     PHYSICAL EXAMINATION:   GENERAL: The patient appears in good general condition, not in acute distress.  SKIN: No ecchymosis.  EYES: No jaundice. No Pallor.  CHEST: Normal respiratory effort.  Lungs clear bilaterally.  No added sounds.  CVS: Normal S1-S2.  No murmurs.  ABDOMEN: Soft.   EXTREMITIES: No noted deformity.     DIAGNOSTIC DATA:   Results from last 7 days   Lab Units 10/30/24  1403   WBC 10*3/mm3 4.42   NEUTROS ABS 10*3/mm3 2.62   HEMOGLOBIN g/dL 14.9   HEMATOCRIT % 45.1   PLATELETS 10*3/mm3 90*     Results from last 7 days   Lab Units 10/30/24  1403   FERRITIN ng/mL 117.40   IRON mcg/dL 87   TIBC mcg/dL 359         Assessment & Plan    *Thrombocytopenia.    It dates back to 2017.  Platelet count was 97,000 on 11/7/2017.    Platelet count was 94,000 on 9/3/2020.    Platelet count decreased to 69,000 on 8/31/2021.    Platelet count was 80,000 on 9/29/2021.    No evidence of platelet clumping.    No increase in the immature platelet fraction.  He had a low normal vitamin B12 level of 365.    Patient was started on vitamin B12 1000 mcg daily on 9/29/2021.  Platelet count slightly improved to 83,000 on 4/27/2022.  Platelets were 84,000 on " 11/9/2022.  Vitamin B12 was 1103.  5/3/2023: Platelets improved to 90,000.  2/21/2024: Platelet count slightly decreased to 88,000.  Vitamin B12 1121 indicating adequate placement.  He is not having problem with bleeding or bruising.  10/30/2024: Platelets 90,000.     *Neutropenia.    This was also suspected of being secondary to vitamin B12 deficiency.    No problem with recurrent infections.  WBC increased to 3360 on 4/27/2022 with a neutrophil count of 2000.  WBC further increased to 4400 on 11/9/2022 with neutrophil count of 2400.  5/3/2023: WBC was 4290.  Neutrophil count 2450.  2/21/2024: WBC decreased to 3980.  Neutrophil count at 2200.  No recent infections.    10/30/2024: WBC 4.42 and ANC 2.62    *Hereditary hemochromatosis carrier.    Patient's father father has hemochromatosis.  Hemochromatosis gene test on 9/29/2021 showed the patient to be a carrier of C282Y mutation.  Ferritin was 225 and transferrin saturation was 23% on 9/29/2021.  On 4/27/2022, ferritin decreased to 179.  Transferrin saturation was 22%.    He does not appear to have the phenotype of hereditary hemochromatosis.  11/9/2022: Ferritin 199.  Transferrin saturation 26%.  5/3/2023: Ferritin 132.7.  Transferrin saturation 27%.  2/21/2024: Ferritin 140.  Transferrin saturation 27%.  10/30/2024: Ferritin 117.40, transferrin saturation 24%.     PLAN:  Continue vitamin B12 1000 mcg daily.   Avoid multivitamins containing iron   Continue to monitor for easy bruising/bleeding  Return to clinic in 9 months for MD visit, cbc/ferritin/iron profile/vitamin B12/folate      AMISHA Benitez  10/29/24

## 2024-10-30 ENCOUNTER — LAB (OUTPATIENT)
Dept: OTHER | Facility: HOSPITAL | Age: 59
End: 2024-10-30
Payer: MEDICARE

## 2024-10-30 ENCOUNTER — OFFICE VISIT (OUTPATIENT)
Dept: ONCOLOGY | Facility: CLINIC | Age: 59
End: 2024-10-30
Payer: MEDICARE

## 2024-10-30 VITALS
HEIGHT: 73 IN | HEART RATE: 80 BPM | OXYGEN SATURATION: 94 % | WEIGHT: 277.4 LBS | RESPIRATION RATE: 16 BRPM | BODY MASS INDEX: 36.77 KG/M2 | DIASTOLIC BLOOD PRESSURE: 73 MMHG | SYSTOLIC BLOOD PRESSURE: 135 MMHG | TEMPERATURE: 97.3 F

## 2024-10-30 DIAGNOSIS — D70.9 NEUTROPENIA, UNSPECIFIED TYPE: ICD-10-CM

## 2024-10-30 DIAGNOSIS — D69.6 THROMBOCYTOPENIA: ICD-10-CM

## 2024-10-30 DIAGNOSIS — Z14.8 HEMOCHROMATOSIS CARRIER: ICD-10-CM

## 2024-10-30 DIAGNOSIS — Z14.8 HEMOCHROMATOSIS CARRIER: Primary | ICD-10-CM

## 2024-10-30 LAB
BASOPHILS # BLD AUTO: 0.02 10*3/MM3 (ref 0–0.2)
BASOPHILS NFR BLD AUTO: 0.5 % (ref 0–1.5)
DEPRECATED RDW RBC AUTO: 42.2 FL (ref 37–54)
EOSINOPHIL # BLD AUTO: 0.18 10*3/MM3 (ref 0–0.4)
EOSINOPHIL NFR BLD AUTO: 4.1 % (ref 0.3–6.2)
ERYTHROCYTE [DISTWIDTH] IN BLOOD BY AUTOMATED COUNT: 13.1 % (ref 12.3–15.4)
FERRITIN SERPL-MCNC: 117.4 NG/ML (ref 30–400)
FOLATE SERPL-MCNC: 16.19 NG/ML (ref 4.78–24.2)
HCT VFR BLD AUTO: 45.1 % (ref 37.5–51)
HGB BLD-MCNC: 14.9 G/DL (ref 13–17.7)
IMM GRANULOCYTES # BLD AUTO: 0.01 10*3/MM3 (ref 0–0.05)
IMM GRANULOCYTES NFR BLD AUTO: 0.2 % (ref 0–0.5)
IRON 24H UR-MRATE: 87 MCG/DL (ref 59–158)
IRON SATN MFR SERPL: 24 % (ref 20–50)
LYMPHOCYTES # BLD AUTO: 1.1 10*3/MM3 (ref 0.7–3.1)
LYMPHOCYTES NFR BLD AUTO: 24.9 % (ref 19.6–45.3)
MCH RBC QN AUTO: 29.2 PG (ref 26.6–33)
MCHC RBC AUTO-ENTMCNC: 33 G/DL (ref 31.5–35.7)
MCV RBC AUTO: 88.4 FL (ref 79–97)
MONOCYTES # BLD AUTO: 0.49 10*3/MM3 (ref 0.1–0.9)
MONOCYTES NFR BLD AUTO: 11.1 % (ref 5–12)
NEUTROPHILS NFR BLD AUTO: 2.62 10*3/MM3 (ref 1.7–7)
NEUTROPHILS NFR BLD AUTO: 59.2 % (ref 42.7–76)
NRBC BLD AUTO-RTO: 0 /100 WBC (ref 0–0.2)
PLAT MORPH BLD: NORMAL
PLATELET # BLD AUTO: 90 10*3/MM3 (ref 140–450)
PMV BLD AUTO: 9.6 FL (ref 6–12)
RBC # BLD AUTO: 5.1 10*6/MM3 (ref 4.14–5.8)
RBC MORPH BLD: NORMAL
TIBC SERPL-MCNC: 359 MCG/DL (ref 298–536)
TRANSFERRIN SERPL-MCNC: 241 MG/DL (ref 200–360)
VIT B12 BLD-MCNC: 1207 PG/ML (ref 211–946)
WBC MORPH BLD: NORMAL
WBC NRBC COR # BLD AUTO: 4.42 10*3/MM3 (ref 3.4–10.8)

## 2024-10-30 PROCEDURE — 85025 COMPLETE CBC W/AUTO DIFF WBC: CPT | Performed by: INTERNAL MEDICINE

## 2024-10-30 PROCEDURE — 82728 ASSAY OF FERRITIN: CPT | Performed by: INTERNAL MEDICINE

## 2024-10-30 PROCEDURE — 85007 BL SMEAR W/DIFF WBC COUNT: CPT | Performed by: INTERNAL MEDICINE

## 2024-10-30 PROCEDURE — 82746 ASSAY OF FOLIC ACID SERUM: CPT | Performed by: INTERNAL MEDICINE

## 2024-10-30 PROCEDURE — 36415 COLL VENOUS BLD VENIPUNCTURE: CPT

## 2024-10-30 PROCEDURE — 82607 VITAMIN B-12: CPT | Performed by: INTERNAL MEDICINE

## 2024-10-30 PROCEDURE — 84466 ASSAY OF TRANSFERRIN: CPT | Performed by: INTERNAL MEDICINE

## 2024-10-30 PROCEDURE — 83540 ASSAY OF IRON: CPT | Performed by: INTERNAL MEDICINE

## 2025-01-22 ENCOUNTER — OFFICE VISIT (OUTPATIENT)
Dept: GASTROENTEROLOGY | Facility: CLINIC | Age: 60
End: 2025-01-22
Payer: MEDICARE

## 2025-01-22 ENCOUNTER — HOSPITAL ENCOUNTER (OUTPATIENT)
Dept: GENERAL RADIOLOGY | Facility: HOSPITAL | Age: 60
Discharge: HOME | End: 2025-01-22
Payer: MEDICARE

## 2025-01-22 VITALS
WEIGHT: 275.8 LBS | SYSTOLIC BLOOD PRESSURE: 118 MMHG | DIASTOLIC BLOOD PRESSURE: 68 MMHG | HEIGHT: 75 IN | BODY MASS INDEX: 34.29 KG/M2

## 2025-01-22 DIAGNOSIS — R15.9 INCONTINENCE OF FECES, UNSPECIFIED FECAL INCONTINENCE TYPE: ICD-10-CM

## 2025-01-22 DIAGNOSIS — Z12.11 SCREENING FOR COLON CANCER: ICD-10-CM

## 2025-01-22 DIAGNOSIS — Z86.0100 HISTORY OF COLONIC POLYPS: Primary | ICD-10-CM

## 2025-01-22 PROCEDURE — 74018 RADEX ABDOMEN 1 VIEW: CPT

## 2025-01-22 NOTE — PROGRESS NOTES
PATIENT INFORMATION  Gavino Samaniego       - 1965    CHIEF COMPLAINT  Chief Complaint   Patient presents with    Irritable Bowel Syndrome    Constipation    Diarrhea       HISTORY OF PRESENT ILLNESS  Here today for follow-up     [place] resident. Somewhat difficult history.     IBS: On daily benefiber at LOV, no longer skipping days, but occasional hard stools, several BM a day, some incontinence episodes, patient self disimpacting sometimes. Per LOV: Bowels moving 3-4 times a day and usually urgent, incontinency every other day, but no longer having hard stools and having to disimpact. Will use miralax PRN and increase fiber.      TODAY: Bowels are doing better per patient. Caregiver does not notice much change. But awaking with stool in underwear and can be a lot, so seems that bowels less controlled without daily miralax, still having hard stools that are requiring disimpaction. Sometimes stomach pain and not necessarily related to BM.      GERD: Well controlled on daily omeprazole, no n/v/reflux          REVIEWED PERTINENT RESULTS/ LABS  Lab Results   Component Value Date    CASEREPORT  2024     Surgical Pathology Report                         Case: [id number]                                  Authorizing Provider:  Manuel Clarke        Collected:           2024 01:03 PM                                 MD Candice                                                                   Ordering Location:     [facility]   Received:            2024 01:56 PM                                 OR                                                                           Pathologist:           Ra Messer MD                                                         Specimen:    Large Intestine, Sigmoid Colon, x3                                                         FINALDX  2024     1.  Sigmoid colon polyps X.3 biopsy:  A. Fragments of tubular adenoma and  developing hyperplastic polyp.  B. No high-grade dysplasia nor malignancy identified.         Lab Results   Component Value Date    HGB 14.9 10/30/2024    MCV 88.4 10/30/2024    PLT 90 (L) 10/30/2024    HGBA1C 4.8 04/06/2018    TRIG 104 04/06/2018    FERRITIN 117.40 10/30/2024    IRON 87 10/30/2024    TIBC 359 10/30/2024      No results found.    REVIEW OF SYSTEMS  Review of Systems      ACTIVE PROBLEMS  Patient Active Problem List    Diagnosis     Personal history of colonic polyps [Z86.0100]     Thrombocytopenia [D69.6]     Hemochromatosis carrier [Z14.8]     Screening for colon cancer [Z12.11]          PAST MEDICAL HISTORY  Past Medical History:   Diagnosis Date    Cerebral palsy     Colon polyp     Diabetes mellitus     GERD (gastroesophageal reflux disease)     H/O Heart murmur     Hypercholesterolemia     Hyperlipidemia     Hypertension     Intellectual disability     Seasonal allergies     Thrombocytopenia     mild, chronic, and stable    Vitamin D deficiency          SURGICAL HISTORY  Past Surgical History:   Procedure Laterality Date    COLONOSCOPY N/A 10/19/2018    Procedure: COLONOSCOPY TO CECUM AND INTO TERMINAL ILEUM WITH COLD BIOPSY POLYPECTOMIES AND COLD SNARE POLYPECTOMIES;  Surgeon: Manuel Clarke MD;  Location: [facility] ENDOSCOPY;  Service: Gastroenterology    COLONOSCOPY W/ POLYPECTOMY N/A 7/3/2024    Procedure: COLONOSCOPY WITH POLYPECTOMY;  Surgeon: Manuel Clarke MD;  Location: [facility] OR;  Service: Gastroenterology;  Laterality: N/A;  poor prep, sigmoid polyp         FAMILY HISTORY  Family History   Problem Relation Age of Onset    Hemochromatosis Father          SOCIAL HISTORY  Social History     Occupational History     Employer: DISABLED   Tobacco Use    Smoking status: Never    Smokeless tobacco: Never   Vaping Use    Vaping status: Never Used   Substance and Sexual Activity    Alcohol use: No    Drug use: Not on file    Sexual activity: Defer         CURRENT  MEDICATIONS    Current Outpatient Medications:     acetaminophen (TYLENOL) 325 MG tablet, Take 2 tablets by mouth Every 6 (Six) Hours As Needed for Mild Pain., Disp: , Rfl:     albuterol sulfate  (90 Base) MCG/ACT inhaler, Inhale 2 puffs Every 4 (Four) Hours As Needed for Wheezing., Disp: , Rfl:     atorvastatin (LIPITOR) 20 MG tablet, Take 1 tablet by mouth Daily., Disp: , Rfl:     Blood Glucose Monitoring Suppl (ONE TOUCH ULTRA 2) w/Device kit, , Disp: , Rfl:     Dextrose, Diabetic Use, (Glucose) 1 g chewable tablet, Chew 4 g As Needed., Disp: , Rfl:     Diclofenac Sodium (VOLTAREN) 1 % gel gel, Apply 4 g topically to the appropriate area as directed 4 (Four) Times a Day., Disp: , Rfl:     FLUoxetine (PROzac) 10 MG capsule, Take 1 capsule by mouth Daily., Disp: , Rfl:     fluticasone (FLONASE) 50 MCG/ACT nasal spray, , Disp: , Rfl:     Lancets (onetouch ultrasoft) lancets, , Disp: , Rfl:     levocetirizine (XYZAL) 5 MG tablet, Take 1 tablet by mouth Every Evening., Disp: , Rfl:     mupirocin (BACTROBAN) 2 % ointment, Apply 1 Application topically to the appropriate area as directed 3 (Three) Times a Day., Disp: , Rfl:     omeprazole (priLOSEC) 40 MG capsule, Take 1 capsule by mouth Daily., Disp: 90 capsule, Rfl: 3    OneTouch Ultra test strip, , Disp: , Rfl:     polyethylene glycol (MIRALAX) 17 g packet, Take 17 g by mouth Daily As Needed (hard stools)., Disp: 30 packet, Rfl: 11    saccharomyces boulardii (Florastor) 250 MG capsule, Take 1 capsule by mouth 2 (Two) Times a Day., Disp: 60 capsule, Rfl: 3    vitamin B-12 (CYANOCOBALAMIN) 1000 MCG tablet, Take 1 tablet by mouth Daily., Disp: 90 tablet, Rfl: 2    wheat dextrin (BENEFIBER ON THE GO) powder powder, Take 1 each by mouth 2 (Two) Times a Day., Disp: 60 each, Rfl: 3    ALLERGIES  Patient has no known allergies.    VITALS  Vitals:    01/22/25 1323   BP: 118/68   BP Location: Left arm   Patient Position: Sitting   Cuff Size: Large Adult   Weight: 125 kg  "(275 lb 12.8 oz)   Height: 190.5 cm (75\")       PHYSICAL EXAM  Debilities/Disabilities Identified: None  Emotional Behavior: Appropriate  Wt Readings from Last 3 Encounters:   01/22/25 125 kg (275 lb 12.8 oz)   12/06/24 122 kg (270 lb)   10/30/24 126 kg (277 lb 6.4 oz)     Ht Readings from Last 1 Encounters:   01/22/25 190.5 cm (75\")     Body mass index is 34.47 kg/m².  Physical Exam  Constitutional:       General: He is not in acute distress.     Appearance: Normal appearance. He is not ill-appearing.   HENT:      Head: Normocephalic and atraumatic.      Mouth/Throat:      Mouth: Mucous membranes are moist.      Pharynx: No posterior oropharyngeal erythema.   Eyes:      General: No scleral icterus.  Cardiovascular:      Rate and Rhythm: Normal rate and regular rhythm.      Heart sounds: Normal heart sounds.   Pulmonary:      Effort: Pulmonary effort is normal.      Breath sounds: Normal breath sounds.   Abdominal:      General: Abdomen is flat. Bowel sounds are normal. There is no distension.      Palpations: Abdomen is soft. There is no mass.      Tenderness: There is no abdominal tenderness in the right upper quadrant, right lower quadrant, left upper quadrant and left lower quadrant. There is no guarding or rebound. Negative signs include Figueroa's sign.      Hernia: No hernia is present.   Musculoskeletal:      Cervical back: Neck supple.   Skin:     General: Skin is warm.      Capillary Refill: Capillary refill takes less than 2 seconds.   Neurological:      General: No focal deficit present.      Mental Status: He is alert and oriented to person, place, and time.   Psychiatric:         Mood and Affect: Mood normal.         Behavior: Behavior normal.         Thought Content: Thought content normal.         Judgment: Judgment normal.         CLINICAL DATA REVIEWED   reviewed previous lab results and integrated with today's visit, reviewed notes from other physicians and/or last GI encounter, reviewed previous " endoscopy results and available photos, reviewed surgical pathology results from previous biopsies    ASSESSMENT  Diagnoses and all orders for this visit:    Personal history of colonic polyps    Screening for colon cancer    Incontinence of feces, unspecified fecal incontinence type  -     XR Abdomen KUB; Future            PLAN    Control seems to be worse since stopping miralax, suspect overflow incontinence at night  KUB to assess stool burden, then will discuss probable RX, reviewed that with these meds there is often diarrhea for 2-3 days and can take up to 2 weeks to fully adjust  Continue fiber supplement    Return in about 3 months (around 4/22/2025).    I have discussed the above plan with the patient.  They verbalize understanding and are in agreement with the plan.  They have been advised to contact the office for any questions, concerns, or changes related to their health.

## 2025-01-24 DIAGNOSIS — R93.5 ABNORMAL X-RAY OF ABDOMEN: Primary | ICD-10-CM

## 2025-02-06 DIAGNOSIS — K58.2 IRRITABLE BOWEL SYNDROME WITH BOTH CONSTIPATION AND DIARRHEA: ICD-10-CM

## 2025-02-06 RX ORDER — WHEAT DEXTRIN 3 G/4 G
1 POWDER IN PACKET (EA) ORAL 2 TIMES DAILY
Qty: 60 EACH | Refills: 3 | Status: SHIPPED | OUTPATIENT
Start: 2025-02-06

## 2025-02-12 ENCOUNTER — HOSPITAL ENCOUNTER (OUTPATIENT)
Dept: CT IMAGING | Facility: HOSPITAL | Age: 60
Discharge: HOME | End: 2025-02-12
Payer: MEDICARE

## 2025-02-12 DIAGNOSIS — R93.5 ABNORMAL X-RAY OF ABDOMEN: ICD-10-CM

## 2025-02-12 PROCEDURE — 25510000001 IOPAMIDOL 61 % SOLUTION

## 2025-02-12 PROCEDURE — 74177 CT ABD & PELVIS W/CONTRAST: CPT

## 2025-02-12 PROCEDURE — 25510000002 DIATRIZOATE MEGLUMINE & SODIUM PER 1 ML

## 2025-02-12 RX ORDER — IOPAMIDOL 612 MG/ML
100 INJECTION, SOLUTION INTRAVASCULAR
Status: COMPLETED | OUTPATIENT
Start: 2025-02-12 | End: 2025-02-12

## 2025-02-12 RX ORDER — DIATRIZOATE MEGLUMINE AND DIATRIZOATE SODIUM 660; 100 MG/ML; MG/ML
30 SOLUTION ORAL; RECTAL
Status: COMPLETED | OUTPATIENT
Start: 2025-02-12 | End: 2025-02-12

## 2025-02-12 RX ADMIN — IOPAMIDOL 85 ML: 612 INJECTION, SOLUTION INTRAVENOUS at 11:12

## 2025-02-12 RX ADMIN — DIATRIZOATE MEGLUMINE AND DIATRIZOATE SODIUM 30 ML: 660; 100 LIQUID ORAL; RECTAL at 11:12

## 2025-02-19 DIAGNOSIS — R93.89 ABNORMAL CT SCAN: Primary | ICD-10-CM

## 2025-02-20 DIAGNOSIS — R19.00 ABDOMINAL MASS, UNSPECIFIED ABDOMINAL LOCATION: ICD-10-CM

## 2025-02-20 DIAGNOSIS — R16.1 SPLENOMEGALY: Primary | ICD-10-CM

## 2025-02-26 RX ORDER — OMEPRAZOLE 40 MG/1
40 CAPSULE, DELAYED RELEASE ORAL DAILY
Qty: 90 CAPSULE | Refills: 3 | Status: SHIPPED | OUTPATIENT
Start: 2025-02-26

## 2025-03-14 ENCOUNTER — TELEPHONE (OUTPATIENT)
Dept: ONCOLOGY | Facility: CLINIC | Age: 60
End: 2025-03-14
Payer: MEDICARE

## 2025-03-14 DIAGNOSIS — R19.00 ABDOMINAL MASS, UNSPECIFIED ABDOMINAL LOCATION: Primary | ICD-10-CM

## 2025-03-14 DIAGNOSIS — R16.1 SPLENOMEGALY: ICD-10-CM

## 2025-03-14 NOTE — TELEPHONE ENCOUNTER
CALLED [facility] TO INFORM THEM THAT WE NO LONGER TAKE PT'S NEW INSURANCE AND APPT WOULD BE CANCELLED- THEY WERE AWARE OF THE INSURANCE ISSUE AND STATED THEY HAD SIGNED UP TO GO W/ [facility] THAT IS EFFECTIVE AS OF 4/1 AND APPT HAS BEEN R/S

## 2025-04-01 ENCOUNTER — CONSULT (OUTPATIENT)
Dept: ONCOLOGY | Facility: CLINIC | Age: 60
End: 2025-04-01
Payer: MEDICARE

## 2025-04-01 ENCOUNTER — LAB (OUTPATIENT)
Dept: LAB | Facility: HOSPITAL | Age: 60
End: 2025-04-01
Payer: MEDICARE

## 2025-04-01 VITALS
RESPIRATION RATE: 16 BRPM | HEIGHT: 75 IN | SYSTOLIC BLOOD PRESSURE: 124 MMHG | OXYGEN SATURATION: 96 % | TEMPERATURE: 98.2 F | DIASTOLIC BLOOD PRESSURE: 77 MMHG | BODY MASS INDEX: 34.39 KG/M2 | HEART RATE: 86 BPM | WEIGHT: 276.6 LBS

## 2025-04-01 DIAGNOSIS — D69.6 THROMBOCYTOPENIA: ICD-10-CM

## 2025-04-01 DIAGNOSIS — R93.5 ABNORMAL FINDINGS ON DIAGNOSTIC IMAGING OF OTHER ABDOMINAL REGIONS, INCLUDING RETROPERITONEUM: ICD-10-CM

## 2025-04-01 DIAGNOSIS — R59.1 LYMPHADENOPATHY: ICD-10-CM

## 2025-04-01 DIAGNOSIS — Z14.8 HEMOCHROMATOSIS CARRIER: ICD-10-CM

## 2025-04-01 DIAGNOSIS — R16.1 SPLENOMEGALY: ICD-10-CM

## 2025-04-01 DIAGNOSIS — R19.00 RETROPERITONEAL MASS: Primary | ICD-10-CM

## 2025-04-01 DIAGNOSIS — D70.9 NEUTROPENIA, UNSPECIFIED TYPE: ICD-10-CM

## 2025-04-01 LAB
ALBUMIN SERPL-MCNC: 4.2 G/DL (ref 3.5–5.2)
ALBUMIN/GLOB SERPL: 1.8 G/DL
ALP SERPL-CCNC: 125 U/L (ref 39–117)
ALT SERPL W P-5'-P-CCNC: 15 U/L (ref 1–41)
ANION GAP SERPL CALCULATED.3IONS-SCNC: 12.1 MMOL/L (ref 5–15)
AST SERPL-CCNC: 16 U/L (ref 1–40)
BASOPHILS # BLD AUTO: 0.01 10*3/MM3 (ref 0–0.2)
BASOPHILS NFR BLD AUTO: 0.2 % (ref 0–1.5)
BILIRUB SERPL-MCNC: 0.6 MG/DL (ref 0–1.2)
BUN SERPL-MCNC: 17 MG/DL (ref 6–20)
BUN/CREAT SERPL: 15.3 (ref 7–25)
CALCIUM SPEC-SCNC: 9.2 MG/DL (ref 8.6–10.5)
CHLORIDE SERPL-SCNC: 104 MMOL/L (ref 98–107)
CO2 SERPL-SCNC: 26.9 MMOL/L (ref 22–29)
CREAT SERPL-MCNC: 1.11 MG/DL (ref 0.76–1.27)
DEPRECATED RDW RBC AUTO: 43.8 FL (ref 37–54)
EGFRCR SERPLBLD CKD-EPI 2021: 76.5 ML/MIN/1.73
EOSINOPHIL # BLD AUTO: 0.25 10*3/MM3 (ref 0–0.4)
EOSINOPHIL NFR BLD AUTO: 5.6 % (ref 0.3–6.2)
ERYTHROCYTE [DISTWIDTH] IN BLOOD BY AUTOMATED COUNT: 13.5 % (ref 12.3–15.4)
FERRITIN SERPL-MCNC: 113 NG/ML (ref 30–400)
FOLATE SERPL-MCNC: 13 NG/ML (ref 4.78–24.2)
GLOBULIN UR ELPH-MCNC: 2.3 GM/DL
GLUCOSE SERPL-MCNC: 102 MG/DL (ref 65–99)
HCT VFR BLD AUTO: 45.1 % (ref 37.5–51)
HGB BLD-MCNC: 14.7 G/DL (ref 13–17.7)
IMM GRANULOCYTES # BLD AUTO: 0.02 10*3/MM3 (ref 0–0.05)
IMM GRANULOCYTES NFR BLD AUTO: 0.4 % (ref 0–0.5)
IRON 24H UR-MRATE: 66 MCG/DL (ref 59–158)
IRON SATN MFR SERPL: 19 % (ref 20–50)
LDH SERPL-CCNC: 240 U/L (ref 135–225)
LYMPHOCYTES # BLD AUTO: 1.02 10*3/MM3 (ref 0.7–3.1)
LYMPHOCYTES NFR BLD AUTO: 22.7 % (ref 19.6–45.3)
MCH RBC QN AUTO: 29.4 PG (ref 26.6–33)
MCHC RBC AUTO-ENTMCNC: 32.6 G/DL (ref 31.5–35.7)
MCV RBC AUTO: 90.2 FL (ref 79–97)
MONOCYTES # BLD AUTO: 0.47 10*3/MM3 (ref 0.1–0.9)
MONOCYTES NFR BLD AUTO: 10.5 % (ref 5–12)
NEUTROPHILS NFR BLD AUTO: 2.72 10*3/MM3 (ref 1.7–7)
NEUTROPHILS NFR BLD AUTO: 60.6 % (ref 42.7–76)
NRBC BLD AUTO-RTO: 0 /100 WBC (ref 0–0.2)
PLATELET # BLD AUTO: 114 10*3/MM3 (ref 140–450)
PMV BLD AUTO: 9.4 FL (ref 6–12)
POTASSIUM SERPL-SCNC: 4.7 MMOL/L (ref 3.5–5.2)
PROT SERPL-MCNC: 6.5 G/DL (ref 6–8.5)
RBC # BLD AUTO: 5 10*6/MM3 (ref 4.14–5.8)
SODIUM SERPL-SCNC: 143 MMOL/L (ref 136–145)
TIBC SERPL-MCNC: 346 MCG/DL (ref 298–536)
TRANSFERRIN SERPL-MCNC: 232 MG/DL (ref 200–360)
VIT B12 BLD-MCNC: 1243 PG/ML (ref 211–946)
WBC NRBC COR # BLD AUTO: 4.49 10*3/MM3 (ref 3.4–10.8)

## 2025-04-01 PROCEDURE — 36415 COLL VENOUS BLD VENIPUNCTURE: CPT

## 2025-04-01 PROCEDURE — 85025 COMPLETE CBC W/AUTO DIFF WBC: CPT

## 2025-04-01 PROCEDURE — 1126F AMNT PAIN NOTED NONE PRSNT: CPT | Performed by: INTERNAL MEDICINE

## 2025-04-01 PROCEDURE — 82746 ASSAY OF FOLIC ACID SERUM: CPT

## 2025-04-01 PROCEDURE — 83615 LACTATE (LD) (LDH) ENZYME: CPT

## 2025-04-01 PROCEDURE — 82607 VITAMIN B-12: CPT

## 2025-04-01 PROCEDURE — 83540 ASSAY OF IRON: CPT

## 2025-04-01 PROCEDURE — 99215 OFFICE O/P EST HI 40 MIN: CPT | Performed by: INTERNAL MEDICINE

## 2025-04-01 PROCEDURE — 82728 ASSAY OF FERRITIN: CPT

## 2025-04-01 PROCEDURE — 1160F RVW MEDS BY RX/DR IN RCRD: CPT | Performed by: INTERNAL MEDICINE

## 2025-04-01 PROCEDURE — 1159F MED LIST DOCD IN RCRD: CPT | Performed by: INTERNAL MEDICINE

## 2025-04-01 PROCEDURE — 84466 ASSAY OF TRANSFERRIN: CPT

## 2025-04-01 PROCEDURE — 80053 COMPREHEN METABOLIC PANEL: CPT

## 2025-04-01 NOTE — PROGRESS NOTES
Subjective     CHIEF COMPLAINT:      Chief Complaint   Patient presents with    Appointment     HISTORY OF PRESENT ILLNESS:     Gavino Samaniego is a 59 y.o. male patient who is seen today for evaluation of newly discovered abdominal mass.  Patient was having problem with incontinence.  This led to a KUB and then a CT scan which was obtained on 2/12/2025.  This revealed a mass in the retroperitoneum with adjacent lymphadenopathy.  The spleen was enlarged.  Patient is not having abdominal or back pain.  He had some night sweats but the are chronic.    ROS:  Pertinent ROS is in the HPI.     Past medical, surgical, social and family history were reviewed.     MEDICATIONS:    Current Outpatient Medications:     acetaminophen (TYLENOL) 325 MG tablet, Take 2 tablets by mouth Every 6 (Six) Hours As Needed for Mild Pain., Disp: , Rfl:     albuterol sulfate  (90 Base) MCG/ACT inhaler, Inhale 2 puffs Every 4 (Four) Hours As Needed for Wheezing., Disp: , Rfl:     atorvastatin (LIPITOR) 20 MG tablet, Take 1 tablet by mouth Daily., Disp: , Rfl:     Blood Glucose Monitoring Suppl (ONE TOUCH ULTRA 2) w/Device kit, , Disp: , Rfl:     Dextrose, Diabetic Use, (Glucose) 1 g chewable tablet, Chew 4 g As Needed., Disp: , Rfl:     Diclofenac Sodium (VOLTAREN) 1 % gel gel, Apply 4 g topically to the appropriate area as directed 4 (Four) Times a Day., Disp: , Rfl:     FLUoxetine (PROzac) 10 MG capsule, Take 1 capsule by mouth Daily., Disp: , Rfl:     fluticasone (FLONASE) 50 MCG/ACT nasal spray, , Disp: , Rfl:     Lancets (onetouch ultrasoft) lancets, , Disp: , Rfl:     levocetirizine (XYZAL) 5 MG tablet, Take 1 tablet by mouth Every Evening., Disp: , Rfl:     linaclotide (LINZESS) 72 MCG capsule capsule, Take 1 capsule by mouth Every Morning Before Breakfast., Disp: 30 capsule, Rfl: 3    mupirocin (BACTROBAN) 2 % ointment, Apply 1 Application topically to the appropriate area as directed 3 (Three) Times a Day., Disp: , Rfl:      "omeprazole (priLOSEC) 40 MG capsule, Take 1 capsule by mouth Daily., Disp: 90 capsule, Rfl: 3    OneTouch Ultra test strip, , Disp: , Rfl:     polyethylene glycol (MIRALAX) 17 g packet, Take 17 g by mouth Daily As Needed (hard stools)., Disp: 30 packet, Rfl: 11    saccharomyces boulardii (Florastor) 250 MG capsule, Take 1 capsule by mouth 2 (Two) Times a Day., Disp: 60 capsule, Rfl: 3    vitamin B-12 (CYANOCOBALAMIN) 1000 MCG tablet, Take 1 tablet by mouth Daily., Disp: 90 tablet, Rfl: 2    wheat dextrin (BENEFIBER ON THE GO) powder powder, Take 1 each by mouth 2 (Two) Times a Day., Disp: 60 each, Rfl: 3    Objective     VITAL SIGNS:     Vitals:    04/01/25 1121   BP: 124/77   Pulse: 86   Resp: 16   Temp: 98.2 °F (36.8 °C)   TempSrc: Oral   SpO2: 96%   Weight: 125 kg (276 lb 9.6 oz)   Height: 190.5 cm (75\")   PainSc: 0-No pain     Body mass index is 34.57 kg/m².     Wt Readings from Last 5 Encounters:   04/01/25 125 kg (276 lb 9.6 oz)   01/22/25 125 kg (275 lb 12.8 oz)   12/06/24 122 kg (270 lb)   10/30/24 126 kg (277 lb 6.4 oz)   10/18/24 126 kg (278 lb 12.8 oz)     PHYSICAL EXAMINATION:   GENERAL: The patient appears in good general condition, not in acute distress.   SKIN: No Ecchymosis.  EYES: No jaundice. No Pallor.  CHEST: Normal respiratory effort. Normal breathing sounds bilaterally. No added sounds.  CVS: Normal S1 and S2. No Murmur.  ABDOMEN: Soft. No tenderness. No Hepatomegaly. No Splenomegaly. No masses.  EXTREMITIES: No joint deformity.     DIAGNOSTIC DATA:     Results from last 7 days   Lab Units 04/01/25  1115   WBC 10*3/mm3 4.49   NEUTROS ABS 10*3/mm3 2.72   HEMOGLOBIN g/dL 14.7   HEMATOCRIT % 45.1   PLATELETS 10*3/mm3 114*     Results from last 7 days   Lab Units 04/01/25  1115   SODIUM mmol/L 143   POTASSIUM mmol/L 4.7   CHLORIDE mmol/L 104   CO2 mmol/L 26.9   BUN mg/dL 17   CREATININE mg/dL 1.11   CALCIUM mg/dL 9.2   ALBUMIN g/dL 4.2   BILIRUBIN mg/dL 0.6   ALK PHOS U/L 125*   ALT (SGPT) U/L 15 "   AST (SGOT) U/L 16   GLUCOSE mg/dL 102*         Lab 04/01/25  1115   IRON 66   IRON SATURATION (TSAT) 19*   TIBC 346   TRANSFERRIN 232   FERRITIN 113.00      Component      Latest Ref Rng 4/1/2025   LDH      135 - 225 U/L 240 (H)       CT abdomen pelvis on 2/12/2025:  1.  Abnormal approximately 8.3 cm right para-aortic soft tissue  structure, which is incompletely assessed and indeterminate. This does  not clearly reflect a lymph node, although there is concurrent  splenomegaly, and lymphadenopathy/lymphoproliferative process is a  differential consideration. The possibility of a component of focally  herniated hemidiaphragm is an additional differential consideration  given the asymmetric abnormal morphology of the ipsilateral  hemidiaphragm, which includes asymmetric thickening of the diaphragmatic  leaflet anteriorly, as well as asymmetric elevation of the right  hemidiaphragm. Other etiologies are not excluded. Recommend PET/CT.  Comparison with prior imaging would also be helpful, if available.   2.  Cholelithiasis.  3.  Bladder wall thickening, which could be due to poor distention  and/or chronic bladder outlet obstruction in the setting of  prostatomegaly. If there is clinical concern for cystitis, correlation  with urinalysis would be recommended.    Assessment & Plan    *Abdominal mass.  CT abdomen and pelvis on 2/12/2025 revealed a right para-aortic soft tissue masslike structure measuring 8.3 x 3.7 x 7.2 cm.  The differential diagnosis provided was lymphoma due to the presence of adjacent lymph nodes versus benign process related to herniated hemidiaphragm.  Patient is completely asymptomatic.  No pain in the abdomen or back.  I reviewed the CT scan with the patient today.  I recommended obtaining a PET scan for further evaluation.  If the mass is hypermetabolic, I would recommend CT-guided biopsy if it is accessible.  If it is not accessible, we will consider referral to surgery for  biopsy.    *Splenomegaly.  CT scan on 2/12/2025: Spleen measured 18.5 cm in AP dimension.  This may be contributing to the patient's thrombocytopenia.    *Thrombocytopenia.    It dates back to 2017.  Platelet count was 97,000 on 11/7/2017.    Platelet count was 94,000 on 9/3/2020.    Platelet count decreased to 69,000 on 8/31/2021.    Platelet count was 80,000 on 9/29/2021.    No evidence of platelet clumping.    No increase in the immature platelet fraction.  He had a low normal vitamin B12 level of 365.    Patient was started on vitamin B12 1000 mcg daily on 9/29/2021.  Platelet count slightly improved to 83,000 on 4/27/2022.  Platelets were 84,000 on 11/9/2022.  Vitamin B12 was 1103.  5/3/2023: Platelets improved to 90,000.  2/21/2024: Platelet count slightly decreased to 88,000.  Vitamin B12 1121 indicating adequate placement.  4/1/2025: Platelets 114,000.  He is not having problem with bleeding.     *Neutropenia.    This was also suspected of being secondary to vitamin B12 deficiency.    No problem with recurrent infections.  WBC increased to 3360 on 4/27/2022 with a neutrophil count of 2000.  WBC further increased to 4400 on 11/9/2022 with neutrophil count of 2400.  5/3/2023: WBC was 4290.  Neutrophil count 2450.  2/21/2024: WBC decreased to 3980.  Neutrophil count at 2200.  4/1/2025: WBC 4490.  Neutrophils 2720.     *Hereditary hemochromatosis carrier.    Patient's father father has hemochromatosis.  Hemochromatosis gene test on 9/29/2021 showed the patient to be a carrier of C282Y mutation.  Ferritin was 225 and transferrin saturation was 23% on 9/29/2021.  On 4/27/2022, ferritin decreased to 179.  Transferrin saturation was 22%.    He does not appear to have the phenotype of hereditary hemochromatosis.  11/9/2022: Ferritin 199.  Transferrin saturation 26%.  5/3/2023: Ferritin 132.7.  Transferrin saturation 27%.  2/21/2024: Ferritin 140.  Transferrin saturation 27%.  4/1/2025: Ferritin 113.  Transferrin  saturation 19%.       PLAN:     1.  Obtain PET scan.  2.  Obtain peripheral blood flow cytometry.  3.  If the PET scan shows the mass to be hypermetabolic, I recommended CT-guided biopsy of the mass.  If it is not hypermetabolic, this would be indicative of the mass being a benign lesion likely in relation to the diaphragm.    4.  Continue vitamin B12 1000 mcg daily.  5.  Follow-up in 1 month.  Repeat CBC CMP.    I spent 45 minutes caring for Gavino on this date of service. This time includes time spent by me in the following activities: preparing for the visit, reviewing tests, performing a medically appropriate examination and/or evaluation, counseling and educating the patient/family/caregiver, documenting information in the medical record, independently interpreting results and communicating that information with the patient/family/caregiver, care coordination, and ordering test(s)       Amy Jansen MD  04/01/25

## 2025-04-02 LAB — Lab: NORMAL

## 2025-04-08 ENCOUNTER — HOSPITAL ENCOUNTER (OUTPATIENT)
Dept: PET IMAGING | Facility: HOSPITAL | Age: 60
Discharge: HOME | End: 2025-04-08
Payer: MEDICARE

## 2025-04-08 DIAGNOSIS — R93.5 ABNORMAL FINDINGS ON DIAGNOSTIC IMAGING OF OTHER ABDOMINAL REGIONS, INCLUDING RETROPERITONEUM: ICD-10-CM

## 2025-04-08 DIAGNOSIS — R19.00 RETROPERITONEAL MASS: ICD-10-CM

## 2025-04-08 DIAGNOSIS — R59.1 LYMPHADENOPATHY: ICD-10-CM

## 2025-04-08 DIAGNOSIS — R16.1 SPLENOMEGALY: ICD-10-CM

## 2025-04-08 LAB — GLUCOSE BLDC GLUCOMTR-MCNC: 84 MG/DL (ref 70–130)

## 2025-04-08 PROCEDURE — 78815 PET IMAGE W/CT SKULL-THIGH: CPT

## 2025-04-08 PROCEDURE — A9552 F18 FDG: HCPCS | Performed by: INTERNAL MEDICINE

## 2025-04-08 PROCEDURE — 82948 REAGENT STRIP/BLOOD GLUCOSE: CPT

## 2025-04-08 PROCEDURE — 34310000005 FLUDEOXYGLUCOSE F18 SOLUTION: Performed by: INTERNAL MEDICINE

## 2025-04-08 RX ADMIN — FLUDEOXYGLUCOSE F 18 1 DOSE: 200 INJECTION, SOLUTION INTRAVENOUS at 09:19

## 2025-04-30 RX ORDER — LANOLIN ALCOHOL/MO/W.PET/CERES
1000 CREAM (GRAM) TOPICAL DAILY
Qty: 90 TABLET | Refills: 2 | Status: SHIPPED | OUTPATIENT
Start: 2025-04-30

## 2025-05-15 ENCOUNTER — LAB (OUTPATIENT)
Dept: LAB | Facility: HOSPITAL | Age: 60
End: 2025-05-15
Payer: MEDICARE

## 2025-05-15 ENCOUNTER — OFFICE VISIT (OUTPATIENT)
Dept: ONCOLOGY | Facility: CLINIC | Age: 60
End: 2025-05-15
Payer: MEDICARE

## 2025-05-15 VITALS
WEIGHT: 284.4 LBS | SYSTOLIC BLOOD PRESSURE: 125 MMHG | DIASTOLIC BLOOD PRESSURE: 75 MMHG | TEMPERATURE: 97.9 F | OXYGEN SATURATION: 97 % | HEART RATE: 89 BPM | HEIGHT: 75 IN | BODY MASS INDEX: 35.36 KG/M2 | RESPIRATION RATE: 17 BRPM

## 2025-05-15 DIAGNOSIS — R59.1 LYMPHADENOPATHY: ICD-10-CM

## 2025-05-15 DIAGNOSIS — R16.1 SPLENOMEGALY: ICD-10-CM

## 2025-05-15 DIAGNOSIS — D69.6 THROMBOCYTOPENIA: ICD-10-CM

## 2025-05-15 DIAGNOSIS — Z14.8 HEMOCHROMATOSIS CARRIER: ICD-10-CM

## 2025-05-15 DIAGNOSIS — R19.00 RETROPERITONEAL MASS: ICD-10-CM

## 2025-05-15 DIAGNOSIS — R93.5 ABNORMAL FINDINGS ON DIAGNOSTIC IMAGING OF OTHER ABDOMINAL REGIONS, INCLUDING RETROPERITONEUM: ICD-10-CM

## 2025-05-15 DIAGNOSIS — R19.00 RETROPERITONEAL MASS: Primary | ICD-10-CM

## 2025-05-15 LAB
ALBUMIN SERPL-MCNC: 4.3 G/DL (ref 3.5–5.2)
ALBUMIN/GLOB SERPL: 2.5 G/DL
ALP SERPL-CCNC: 109 U/L (ref 39–117)
ALT SERPL W P-5'-P-CCNC: 17 U/L (ref 1–41)
ANION GAP SERPL CALCULATED.3IONS-SCNC: 8.1 MMOL/L (ref 5–15)
AST SERPL-CCNC: 19 U/L (ref 1–40)
BASOPHILS # BLD AUTO: 0.01 10*3/MM3 (ref 0–0.2)
BASOPHILS NFR BLD AUTO: 0.2 % (ref 0–1.5)
BILIRUB SERPL-MCNC: 0.6 MG/DL (ref 0–1.2)
BUN SERPL-MCNC: 19 MG/DL (ref 6–20)
BUN/CREAT SERPL: 16.8 (ref 7–25)
CALCIUM SPEC-SCNC: 8.9 MG/DL (ref 8.6–10.5)
CHLORIDE SERPL-SCNC: 106 MMOL/L (ref 98–107)
CO2 SERPL-SCNC: 27.9 MMOL/L (ref 22–29)
CREAT SERPL-MCNC: 1.13 MG/DL (ref 0.76–1.27)
DEPRECATED RDW RBC AUTO: 44.9 FL (ref 37–54)
EGFRCR SERPLBLD CKD-EPI 2021: 74.9 ML/MIN/1.73
EOSINOPHIL # BLD AUTO: 0.22 10*3/MM3 (ref 0–0.4)
EOSINOPHIL NFR BLD AUTO: 5.4 % (ref 0.3–6.2)
ERYTHROCYTE [DISTWIDTH] IN BLOOD BY AUTOMATED COUNT: 13.8 % (ref 12.3–15.4)
GLOBULIN UR ELPH-MCNC: 1.7 GM/DL
GLUCOSE SERPL-MCNC: 160 MG/DL (ref 65–99)
HCT VFR BLD AUTO: 43.1 % (ref 37.5–51)
HGB BLD-MCNC: 13.9 G/DL (ref 13–17.7)
IMM GRANULOCYTES # BLD AUTO: 0.01 10*3/MM3 (ref 0–0.05)
IMM GRANULOCYTES NFR BLD AUTO: 0.2 % (ref 0–0.5)
LDH SERPL-CCNC: 229 U/L (ref 135–225)
LYMPHOCYTES # BLD AUTO: 1.02 10*3/MM3 (ref 0.7–3.1)
LYMPHOCYTES NFR BLD AUTO: 24.9 % (ref 19.6–45.3)
MCH RBC QN AUTO: 29 PG (ref 26.6–33)
MCHC RBC AUTO-ENTMCNC: 32.3 G/DL (ref 31.5–35.7)
MCV RBC AUTO: 89.8 FL (ref 79–97)
MONOCYTES # BLD AUTO: 0.34 10*3/MM3 (ref 0.1–0.9)
MONOCYTES NFR BLD AUTO: 8.3 % (ref 5–12)
NEUTROPHILS NFR BLD AUTO: 2.5 10*3/MM3 (ref 1.7–7)
NEUTROPHILS NFR BLD AUTO: 61 % (ref 42.7–76)
NRBC BLD AUTO-RTO: 0 /100 WBC (ref 0–0.2)
PLATELET # BLD AUTO: 88 10*3/MM3 (ref 140–450)
PMV BLD AUTO: 9.7 FL (ref 6–12)
POTASSIUM SERPL-SCNC: 4.2 MMOL/L (ref 3.5–5.2)
PROT SERPL-MCNC: 6 G/DL (ref 6–8.5)
RBC # BLD AUTO: 4.8 10*6/MM3 (ref 4.14–5.8)
SODIUM SERPL-SCNC: 142 MMOL/L (ref 136–145)
WBC NRBC COR # BLD AUTO: 4.1 10*3/MM3 (ref 3.4–10.8)

## 2025-05-15 PROCEDURE — 85025 COMPLETE CBC W/AUTO DIFF WBC: CPT

## 2025-05-15 PROCEDURE — 80053 COMPREHEN METABOLIC PANEL: CPT

## 2025-05-15 PROCEDURE — 36415 COLL VENOUS BLD VENIPUNCTURE: CPT

## 2025-05-15 PROCEDURE — 83615 LACTATE (LD) (LDH) ENZYME: CPT

## 2025-05-15 NOTE — PROGRESS NOTES
Subjective     CHIEF COMPLAINT:      Chief Complaint   Patient presents with    Follow-up     HISTORY OF PRESENT ILLNESS:     Gavino Samaniego is a 59 y.o. male patient who returns today for follow up on his thrombocytopenia and a suspected mass in the retroperitoneum.  He returns today for follow-up and not to report having abdominal pain.  No pain in the chest.  No problem with bleeding or excessive bruising.  No history of trauma to the chest or MVA in the past.    ROS:  Pertinent ROS is in the HPI.     Past medical, surgical, social and family history were reviewed.     MEDICATIONS:    Current Outpatient Medications:     acetaminophen (TYLENOL) 325 MG tablet, Take 2 tablets by mouth Every 6 (Six) Hours As Needed for Mild Pain., Disp: , Rfl:     albuterol sulfate  (90 Base) MCG/ACT inhaler, Inhale 2 puffs Every 4 (Four) Hours As Needed for Wheezing., Disp: , Rfl:     atorvastatin (LIPITOR) 20 MG tablet, Take 1 tablet by mouth Daily., Disp: , Rfl:     Blood Glucose Monitoring Suppl (ONE TOUCH ULTRA 2) w/Device kit, , Disp: , Rfl:     Dextrose, Diabetic Use, (Glucose) 1 g chewable tablet, Chew 4 g As Needed., Disp: , Rfl:     Diclofenac Sodium (VOLTAREN) 1 % gel gel, Apply 4 g topically to the appropriate area as directed 4 (Four) Times a Day., Disp: , Rfl:     FLUoxetine (PROzac) 10 MG capsule, Take 1 capsule by mouth Daily., Disp: , Rfl:     fluticasone (FLONASE) 50 MCG/ACT nasal spray, , Disp: , Rfl:     Lancets (onetouch ultrasoft) lancets, , Disp: , Rfl:     levocetirizine (XYZAL) 5 MG tablet, Take 1 tablet by mouth Every Evening., Disp: , Rfl:     linaclotide (LINZESS) 72 MCG capsule capsule, Take 1 capsule by mouth Every Morning Before Breakfast., Disp: 30 capsule, Rfl: 3    mupirocin (BACTROBAN) 2 % ointment, Apply 1 Application topically to the appropriate area as directed 3 (Three) Times a Day., Disp: , Rfl:     omeprazole (priLOSEC) 40 MG capsule, Take 1 capsule by mouth Daily., Disp: 90  "capsule, Rfl: 3    OneTouch Ultra test strip, , Disp: , Rfl:     polyethylene glycol (MIRALAX) 17 g packet, Take 17 g by mouth Daily As Needed (hard stools)., Disp: 30 packet, Rfl: 11    saccharomyces boulardii (Florastor) 250 MG capsule, Take 1 capsule by mouth 2 (Two) Times a Day., Disp: 60 capsule, Rfl: 3    vitamin B-12 (CYANOCOBALAMIN) 1000 MCG tablet, Take 1 tablet by mouth Daily., Disp: 90 tablet, Rfl: 2    wheat dextrin (BENEFIBER ON THE GO) powder powder, Take 1 each by mouth 2 (Two) Times a Day., Disp: 60 each, Rfl: 3  Objective     VITAL SIGNS:     Vitals:    05/15/25 1359   BP: 125/75   Pulse: 89   Resp: 17   Temp: 97.9 °F (36.6 °C)   TempSrc: Oral   SpO2: 97%   Weight: 129 kg (284 lb 6.4 oz)   Height: 190 cm (74.8\")   PainSc: 0-No pain     Body mass index is 35.74 kg/m².     Wt Readings from Last 5 Encounters:   05/15/25 129 kg (284 lb 6.4 oz)   04/01/25 125 kg (276 lb 9.6 oz)   01/22/25 125 kg (275 lb 12.8 oz)   12/06/24 122 kg (270 lb)   10/30/24 126 kg (277 lb 6.4 oz)     PHYSICAL EXAMINATION:   GENERAL: The patient appears in good general condition, not in acute distress.   SKIN: No Ecchymosis.  EYES: No jaundice. No Pallor.  CHEST: Normal respiratory effort.  Decreased breath sounds at the right lower lung field.  CVS: Normal S1 and S2. No Murmur.  ABDOMEN: Soft. No tenderness. No Hepatomegaly. No Splenomegaly. No masses.  EXTREMITIES: No joint deformity.     DIAGNOSTIC DATA:     Results from last 7 days   Lab Units 05/15/25  1337   WBC 10*3/mm3 4.10   NEUTROS ABS 10*3/mm3 2.50   HEMOGLOBIN g/dL 13.9   HEMATOCRIT % 43.1   PLATELETS 10*3/mm3 88*     Results from last 7 days   Lab Units 05/15/25  1337   SODIUM mmol/L 142   POTASSIUM mmol/L 4.2   CHLORIDE mmol/L 106   CO2 mmol/L 27.9   BUN mg/dL 19   CREATININE mg/dL 1.13   CALCIUM mg/dL 8.9   ALBUMIN g/dL 4.3   BILIRUBIN mg/dL 0.6   ALK PHOS U/L 109   ALT (SGPT) U/L 17   AST (SGOT) U/L 19   GLUCOSE mg/dL 160*     Flow cytometry on 4/1/2025:  No " significant immunophenotypic abnormalities of myeloid   and lymphoid cells     PET scan on 4/8/2025:  1. The bandlike soft tissue lesion along the posteromedial right  diaphragm has the same metabolic appearance as does the diaphragmatic  crura bilaterally. The elevation of the right hemidiaphragm and the  findings suggest the possibility of a posttraumatic right diaphragmatic  abnormality or possibly congenital. The maximal SUV of the lesion is 2.9  and the maximal SUV of the left posterior diaphragmatic delbert is 2.8.     There is significant compressive atelectasis at the right lower lobe.  Other than the atelectatic lung, there are no hypermetabolic lung  opacities and there is no hypermetabolic lymphadenopathy within the  chest or abdomen.     2. There is no suspicious solid organ activity within the abdomen or  pelvis. The enlarged spleen, 19 cm, has the same metabolic activity as  the liver. Again, there is no hypermetabolic lymphadenopathy.  There is nonspecific heterogeneous activity within the prostate. There  are no CT findings of acute prostatitis. PSA follow-up is recommended.     3. There is no suspicious hypermetabolic activity at the neck.     4. There is no suspicious bone activity.     RECOMMENDATION: If there is no outside facility CT abdomen for  comparison, a follow-up CT abdomen with IV contrast is recommended in  3-6 months to evaluate for stability.    Assessment & Plan    *Abdominal mass.  CT abdomen and pelvis on 2/12/2025 revealed a right para-aortic soft tissue masslike structure measuring 8.3 x 3.7 x 7.2 cm.  The differential diagnosis provided was lymphoma due to the presence of adjacent lymph nodes versus benign process related to herniated hemidiaphragm.  PET scan for 825 revealed that the soft tissue lesion along the posterior medial right diaphragm had the same metabolic appearance as the diaphragmatic crura bilaterally.  Elevation of the right hemidiaphragm was noted.  The maximal  SUV over the region was 2.9 and for the left posterior diaphragmatic delbert was 2.8.  There is no lymphadenopathy within the chest or abdomen.  The bandlike soft tissue was felt to be most likely representing diaphragm or diaphragmatic delbert.  No history of trauma to the chest.  ?  Congenital.  I recommended repeating CT scan in 6 months.    *Splenomegaly.  CT scan on 2/12/2025: Spleen measured 18.5 cm in AP dimension.  This may be contributing to the patient's thrombocytopenia.  Flow cytometry on 4/1/2025 revealed no abnormality.  PET scan on 4/8/2025 revealed splenomegaly.    Spleen measured 19 cm.  Had the same metabolic activity as the liver.  There was no hypermetabolic lymphadenopathy.  Based on the finding, I did not recommend bone marrow biopsy or splenectomy.  I explained that a biopsy cannot be obtained from the spleen since this may result in splenic rupture.    *Thrombocytopenia.    It dates back to 2017.  Platelet count was 97,000 on 11/7/2017.    Platelet count was 94,000 on 9/3/2020.    Platelet count decreased to 69,000 on 8/31/2021.    Platelet count was 80,000 on 9/29/2021.    No evidence of platelet clumping.    No increase in the immature platelet fraction.  He had a low normal vitamin B12 level of 365.    Patient was started on vitamin B12 1000 mcg daily on 9/29/2021.  Platelet count slightly improved to 83,000 on 4/27/2022.  Platelets were 84,000 on 11/9/2022.  Vitamin B12 was 1103.  5/3/2023: Platelets improved to 90,000.  2/21/2024: Platelet count slightly decreased to 88,000.  Vitamin B12 1121 indicating adequate placement.  4/1/2025: Platelets 114,000.  5/15/2025: Platelets decreased to 88,000.     *Hereditary hemochromatosis carrier.    Patient's father father has hemochromatosis.  Hemochromatosis gene test on 9/29/2021 showed the patient to be a carrier of C282Y mutation.  Ferritin was 225 and transferrin saturation was 23% on 9/29/2021.  On 4/27/2022, ferritin decreased to 179.  Transferrin  saturation was 22%.    He does not appear to have the phenotype of hereditary hemochromatosis.  11/9/2022: Ferritin 199.  Transferrin saturation 26%.  5/3/2023: Ferritin 132.7.  Transferrin saturation 27%.  2/21/2024: Ferritin 140.  Transferrin saturation 27%.  4/1/2025: Ferritin 113.  Transferrin saturation 19%.    We will repeat iron studies in 6 months.     PLAN:     1.  We will monitor the splenomegaly.  I recommended repeating CT scan in 6 months.  2.  Continue vitamin B12 1000 mcg daily.  3.  Follow-up in 6 months to review the CT scan.  Will obtain CBC CMP ferritin iron panel folate and methylmalonic acid levels.      Amy Jansen MD  05/15/25

## 2025-05-27 DIAGNOSIS — K58.2 IRRITABLE BOWEL SYNDROME WITH BOTH CONSTIPATION AND DIARRHEA: ICD-10-CM

## 2025-05-27 RX ORDER — WHEAT DEXTRIN 3 G/4 G
1 POWDER IN PACKET (EA) ORAL 2 TIMES DAILY
Qty: 60 EACH | Refills: 3 | Status: SHIPPED | OUTPATIENT
Start: 2025-05-27

## 2025-06-04 DIAGNOSIS — K58.2 IRRITABLE BOWEL SYNDROME WITH BOTH CONSTIPATION AND DIARRHEA: ICD-10-CM

## 2025-06-04 RX ORDER — SACCHAROMYCES BOULARDII 250 MG
250 CAPSULE ORAL 2 TIMES DAILY
Qty: 60 CAPSULE | Refills: 3 | Status: SHIPPED | OUTPATIENT
Start: 2025-06-04

## 2025-06-05 ENCOUNTER — OFFICE VISIT (OUTPATIENT)
Dept: GASTROENTEROLOGY | Facility: CLINIC | Age: 60
End: 2025-06-05
Payer: MEDICARE

## 2025-06-05 VITALS
SYSTOLIC BLOOD PRESSURE: 130 MMHG | HEIGHT: 75 IN | DIASTOLIC BLOOD PRESSURE: 80 MMHG | BODY MASS INDEX: 36.01 KG/M2 | WEIGHT: 289.6 LBS

## 2025-06-05 DIAGNOSIS — K21.00 GASTROESOPHAGEAL REFLUX DISEASE WITH ESOPHAGITIS WITHOUT HEMORRHAGE: ICD-10-CM

## 2025-06-05 DIAGNOSIS — K59.04 CHRONIC IDIOPATHIC CONSTIPATION: Primary | ICD-10-CM

## 2025-06-05 RX ORDER — DEXTROMETHORPHAN POLISTIREX 30 MG/5ML
30 SUSPENSION ORAL AS NEEDED
COMMUNITY
Start: 2025-03-20

## 2025-06-05 RX ORDER — CHLORCYCLIZINE HYDROCHLORIDE AND PSEUDOEPHEDRINE HYDROCHLORIDE 25; 60 MG/1; MG/1
25 TABLET ORAL AS NEEDED
COMMUNITY
Start: 2025-01-20

## 2025-06-05 NOTE — PROGRESS NOTES
PATIENT INFORMATION  Gavino Samaniego       - 1965    CHIEF COMPLAINT  Chief Complaint   Patient presents with    Diarrhea       HISTORY OF PRESENT ILLNESS  Here today for follow-up     [place] resident. Somewhat difficult history.     IBS: Per LOV: Bowels are doing better per patient. Caregiver does not notice much change. But awaking with stool in underwear and can be a lot, so seems that bowels less controlled without daily miralax, still having hard stools that are requiring disimpaction. Sometimes stomach pain and not necessarily related to BM.      72 mcg linzess started after LOV. CT A/P reviewed splenomegaly and soft tissue mass, with reassuring PET scan findings. This is now being monitored by Hem/Onc. Pt also found to be HHC carrier.     TODAY: Bowels are doing better on linzess, bowels are daily and easier and not having to disimpact, less accidents, but still maybe a few a week. Still with fiber powder twice a day. Not skipping days without BM. Nothing solid. Small amount in underwear when having accidents. Will try the next dose up, reviewed that there is a possibility it could cause more diarrhea, but hopeful it will actually improve incontinence episodes further.     GERD: Well controlled on daily omeprazole, no n/v/reflux. Still doing well.     7/3/2024 colon with 2 adenomas and poor prep, 3 yr recall with 2 day prep            REVIEWED PERTINENT RESULTS/ LABS  Lab Results   Component Value Date    CASEREPORT  2024     Surgical Pathology Report                         Case: [id number]                                  Authorizing Provider:  Manuel Clarke        Collected:           2024 01:03 PM                                 MD Candice                                                                   Ordering Location:     [facility]   Received:            2024 01:56 PM                                 OR                                                                            Pathologist:           Ra Messer MD                                                         Specimen:    Large Intestine, Sigmoid Colon, x3                                                         FINALDX  07/03/2024     1.  Sigmoid colon polyps X.3 biopsy:  A. Fragments of tubular adenoma and developing hyperplastic polyp.  B. No high-grade dysplasia nor malignancy identified.         Lab Results   Component Value Date    HGB 13.9 05/15/2025    MCV 89.8 05/15/2025    PLT 88 (L) 05/15/2025    ALT 17 05/15/2025    AST 19 05/15/2025    HGBA1C 4.8 04/06/2018    TRIG 104 04/06/2018    FERRITIN 113.00 04/01/2025    IRON 66 04/01/2025    TIBC 346 04/01/2025      No results found.    REVIEW OF SYSTEMS  Review of Systems      ACTIVE PROBLEMS  Patient Active Problem List    Diagnosis     Personal history of colonic polyps [Z86.0100]     Thrombocytopenia [D69.6]     Hemochromatosis carrier [Z14.8]     Screening for colon cancer [Z12.11]          PAST MEDICAL HISTORY  Past Medical History:   Diagnosis Date    Cerebral palsy     Colon polyp     Diabetes mellitus     GERD (gastroesophageal reflux disease)     H/O Heart murmur     Hypercholesterolemia     Hyperlipidemia     Hypertension     Intellectual disability     Seasonal allergies     Thrombocytopenia     mild, chronic, and stable    Vitamin D deficiency          SURGICAL HISTORY  Past Surgical History:   Procedure Laterality Date    COLONOSCOPY N/A 10/19/2018    Procedure: COLONOSCOPY TO CECUM AND INTO TERMINAL ILEUM WITH COLD BIOPSY POLYPECTOMIES AND COLD SNARE POLYPECTOMIES;  Surgeon: Manuel Clarke MD;  [facility];  Service: Gastroenterology    COLONOSCOPY W/ POLYPECTOMY N/A 7/3/2024    Procedure: COLONOSCOPY WITH POLYPECTOMY;  Surgeon: Manuel Clarke MD;  Location[facility] OR;  Service: Gastroenterology;  Laterality: N/A;  poor prep, sigmoid polyp         FAMILY HISTORY  Family History    Problem Relation Age of Onset    Hemochromatosis Father          SOCIAL HISTORY  Social History     Occupational History     Employer: DISABLED   Tobacco Use    Smoking status: Never    Smokeless tobacco: Never   Vaping Use    Vaping status: Never Used   Substance and Sexual Activity    Alcohol use: No    Drug use: Not on file    Sexual activity: Defer         CURRENT MEDICATIONS    Current Outpatient Medications:     acetaminophen (TYLENOL) 325 MG tablet, Take 2 tablets by mouth Every 6 (Six) Hours As Needed for Mild Pain., Disp: , Rfl:     albuterol sulfate  (90 Base) MCG/ACT inhaler, Inhale 2 puffs Every 4 (Four) Hours As Needed for Wheezing., Disp: , Rfl:     atorvastatin (LIPITOR) 20 MG tablet, Take 1 tablet by mouth Daily., Disp: , Rfl:     Blood Glucose Monitoring Suppl (ONE TOUCH ULTRA 2) w/Device kit, , Disp: , Rfl:     Chlorcyclizine-Pseudoephed (Stahist AD) 25-60 MG tablet, Take 25 mg of pseudoephedrine by mouth As Needed., Disp: , Rfl:     dextromethorphan polistirex ER (DELSYM) 30 MG/5ML Suspension Extended Release oral suspension, Take 5 mL by mouth As Needed., Disp: , Rfl:     Dextrose, Diabetic Use, (Glucose) 1 g chewable tablet, Chew 4 g As Needed., Disp: , Rfl:     Diclofenac Sodium (VOLTAREN) 1 % gel gel, Apply 4 g topically to the appropriate area as directed 4 (Four) Times a Day., Disp: , Rfl:     FLUoxetine (PROzac) 10 MG capsule, Take 1 capsule by mouth Daily., Disp: , Rfl:     fluticasone (FLONASE) 50 MCG/ACT nasal spray, , Disp: , Rfl:     Lancets (onetouch ultrasoft) lancets, , Disp: , Rfl:     levocetirizine (XYZAL) 5 MG tablet, Take 1 tablet by mouth Every Evening., Disp: , Rfl:     mupirocin (BACTROBAN) 2 % ointment, Apply 1 Application topically to the appropriate area as directed 3 (Three) Times a Day., Disp: , Rfl:     omeprazole (priLOSEC) 40 MG capsule, Take 1 capsule by mouth Daily., Disp: 90 capsule, Rfl: 3    OneTouch Ultra test strip, , Disp: , Rfl:     polyethylene  "glycol (MIRALAX) 17 g packet, Take 17 g by mouth Daily As Needed (hard stools)., Disp: 30 packet, Rfl: 11    saccharomyces boulardii (Florastor) 250 MG capsule, Take 1 capsule by mouth 2 (Two) Times a Day., Disp: 60 capsule, Rfl: 3    vitamin B-12 (CYANOCOBALAMIN) 1000 MCG tablet, Take 1 tablet by mouth Daily., Disp: 90 tablet, Rfl: 2    wheat dextrin (BENEFIBER ON THE GO) powder powder, Take 1 each by mouth 2 (Two) Times a Day., Disp: 60 each, Rfl: 3    linaclotide (LINZESS) 145 MCG capsule capsule, Take 1 capsule by mouth Every Morning Before Breakfast., Disp: 30 capsule, Rfl: 11    ALLERGIES  Patient has no known allergies.    VITALS  Vitals:    06/05/25 1307   BP: 130/80   BP Location: Left arm   Patient Position: Sitting   Cuff Size: Adult   Weight: 131 kg (289 lb 9.6 oz)   Height: 190 cm (74.8\")       PHYSICAL EXAM  Debilities/Disabilities Identified: None  Emotional Behavior: Appropriate  Wt Readings from Last 3 Encounters:   06/05/25 131 kg (289 lb 9.6 oz)   05/15/25 129 kg (284 lb 6.4 oz)   04/01/25 125 kg (276 lb 9.6 oz)     Ht Readings from Last 1 Encounters:   06/05/25 190 cm (74.8\")     Body mass index is 36.39 kg/m².  Physical Exam  Constitutional:       General: He is not in acute distress.     Appearance: Normal appearance. He is not ill-appearing.   HENT:      Head: Normocephalic and atraumatic.      Mouth/Throat:      Mouth: Mucous membranes are moist.      Pharynx: No posterior oropharyngeal erythema.   Eyes:      General: No scleral icterus.  Cardiovascular:      Rate and Rhythm: Normal rate and regular rhythm.      Heart sounds: Normal heart sounds.   Pulmonary:      Effort: Pulmonary effort is normal.      Breath sounds: Normal breath sounds.   Abdominal:      General: Abdomen is flat. Bowel sounds are normal. There is no distension.      Palpations: Abdomen is soft. There is no mass.      Tenderness: There is no abdominal tenderness. There is no guarding or rebound. Negative signs include " Figueroa's sign.      Hernia: No hernia is present.   Musculoskeletal:      Cervical back: Neck supple.   Skin:     General: Skin is warm.      Capillary Refill: Capillary refill takes less than 2 seconds.   Neurological:      General: No focal deficit present.      Mental Status: He is alert and oriented to person, place, and time.   Psychiatric:         Mood and Affect: Mood normal.         Behavior: Behavior normal.         Thought Content: Thought content normal.         Judgment: Judgment normal.         CLINICAL DATA REVIEWED   reviewed previous lab results and integrated with today's visit, reviewed notes from other physicians and/or last GI encounter, reviewed previous endoscopy results and available photos, reviewed surgical pathology results from previous biopsies    ASSESSMENT  Diagnoses and all orders for this visit:    Chronic idiopathic constipation    Gastroesophageal reflux disease with esophagitis without hemorrhage    Other orders  -     Chlorcyclizine-Pseudoephed (Stahist AD) 25-60 MG tablet; Take 25 mg of pseudoephedrine by mouth As Needed.  -     dextromethorphan polistirex ER (DELSYM) 30 MG/5ML Suspension Extended Release oral suspension; Take 5 mL by mouth As Needed.  -     linaclotide (LINZESS) 145 MCG capsule capsule; Take 1 capsule by mouth Every Morning Before Breakfast.          PLAN    Will increase linzess to 145 hoping for even better control over bowels, reviewed that it may increase diarrhea, please call if worsening bowel control  GERD: Continue daily PPI    Return in about 3 months (around 9/5/2025).    I have discussed the above plan with the patient.  They verbalize understanding and are in agreement with the plan.  They have been advised to contact the office for any questions, concerns, or changes related to their health.

## 2025-07-11 NOTE — PROGRESS NOTES
Subjective     CHIEF COMPLAINT:      Chief Complaint   Patient presents with   • Follow-up     No concerns     HISTORY OF PRESENT ILLNESS:     Gavino Samaniego is a 56 y.o. male patient who returns today for follow up on his thrombocytopenia, vitamin B12 deficiency and carrier state for hereditary hemochromatosis.  He returns today for follow-up reporting occasional bruising.  No problem with bleeding.  Energy level is good.  No problem with fatigue.  He is taking vitamin B12 daily as instructed.    ROS:  Pertinent ROS is in the HPI.     Past medical, surgical, social and family history were reviewed.     MEDICATIONS:    Current Outpatient Medications:   •  acetaminophen (TYLENOL) 325 MG tablet, Take 650 mg by mouth Every 6 (Six) Hours As Needed for Mild Pain ., Disp: , Rfl:   •  albuterol sulfate  (90 Base) MCG/ACT inhaler, Inhale 2 puffs Every 4 (Four) Hours As Needed for Wheezing., Disp: , Rfl:   •  atorvastatin (LIPITOR) 20 MG tablet, Take 20 mg by mouth daily., Disp: , Rfl:   •  Chlorcyclizine-Pseudoephed (STAHIST AD PO), Take  by mouth As Needed., Disp: , Rfl:   •  Dextromethorphan-guaiFENesin (DELSYM COUGH/CHEST CONGEST DM PO), Take  by mouth., Disp: , Rfl:   •  Dextrose, Diabetic Use, (Glucose) 1 g chewable tablet, Chew 4 g As Needed., Disp: , Rfl:   •  FLUoxetine (PROzac) 10 MG capsule, Take 10 mg by mouth Daily., Disp: , Rfl:   •  fluticasone (FLONASE) 50 MCG/ACT nasal spray, , Disp: , Rfl:   •  Lancets (onetouch ultrasoft) lancets, , Disp: , Rfl:   •  levocetirizine (XYZAL) 5 MG tablet, Take 5 mg by mouth Every Evening., Disp: , Rfl:   •  mupirocin (BACTROBAN) 2 % ointment, Apply 1 application topically to the appropriate area as directed 3 (Three) Times a Day., Disp: , Rfl:   •  OneTouch Ultra test strip, , Disp: , Rfl:   •  vitamin B-12 (CYANOCOBALAMIN) 1000 MCG tablet, Take 1 tablet by mouth Daily., Disp: 30 tablet, Rfl: 2  Objective     VITAL SIGNS:     Vitals:    11/09/22 1005   BP: 118/70  "  Pulse: 71   Resp: 18   Temp: 96.9 °F (36.1 °C)   TempSrc: Temporal   SpO2: 97%   Weight: 115 kg (253 lb 14.4 oz)   Height: 186 cm (73.23\")   PainSc: 0-No pain     Body mass index is 33.29 kg/m².     Wt Readings from Last 5 Encounters:   11/09/22 115 kg (253 lb 14.4 oz)   04/27/22 111 kg (243 lb 12.8 oz)   09/29/21 101 kg (221 lb 9.6 oz)   10/19/18 105 kg (231 lb 9 oz)     PHYSICAL EXAMINATION:  GENERAL: The patient appears in good general condition, not in acute distress.   SKIN: No ecchymosis.  EYES: No jaundice. No pallor.  CHEST: Normal respiratory effort.   CVS: No edema.  ABDOMEN: Soft. No tenderness. No Hepatomegaly. No Splenomegaly. No masses.  EXTREMITIES: No noted deformity.     DIAGNOSTIC DATA:     Results from last 7 days   Lab Units 11/09/22  0955   WBC 10*3/mm3 4.40   NEUTROS ABS 10*3/mm3 2.40   HEMOGLOBIN g/dL 15.6   HEMATOCRIT % 46.6   PLATELETS 10*3/mm3 84*         Lab 11/09/22  0955   IRON 95   IRON SATURATION 26   TIBC 359   TRANSFERRIN 241   FERRITIN 199.70   VITAMIN B 12 1,103*      Assessment & Plan    *Thrombocytopenia.    · It dates back to 2017.  Platelet count was 97,000 on 11/7/2017.    · Platelet count was 94,000 on 9/3/2020.    · Platelet count decreased to 69,000 on 8/31/2021.    · Platelet count was 80,000 on 9/29/2021.    · No evidence of platelet clumping.    · No increase in the immature platelet fraction.  · He had a low normal vitamin B12 level of 365.    · Patient was started on vitamin B12 1000 mcg daily on 9/29/2021.  · Platelet count slightly improved to 83,000 on 4/27/2022.  · Platelet count is 84,000 today.  · Vitamin B12 is 1,103.   · He has occasional bruising.  No problem with bleeding.    *Neutropenia.    · This was also suspected of being secondary to vitamin B12 deficiency.    · Medication induced neutropenia was considered unlikely.    · No problem with recurrent infections.  · WBC increased to 3360 on 4/27/2022 with a neutrophil count of 2000.  · WBC further " increased to 4400 today with neutrophil count of 2400.    *Hereditary hemochromatosis.    · Patient's father father has hemochromatosis.  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  · Ferritin was 225 and transferrin saturation was 23% on 9/29/2021.  · On 4/27/2022, ferritin decreased to 179.  Transferrin saturation was 22%.    · He does not appear to have the phenotype of hereditary hemochromatosis.  · Ferritin is 199 today.  Transferrin saturation was 26%.    PLAN:    1.  Continue vitamin B12 1000 mcg daily.    2.  Avoid taking iron supplements.  3.  Follow-up in 6 months with CBC ferritin iron panel B12 and folate levels.        Amy Jansen MD  11/09/22      No

## (undated) DEVICE — SNAR POLYP SENSATION STDOVL 27 240 BX40

## (undated) DEVICE — JACKT LAB F/R KNIT CUFF/COLR XLG BLU

## (undated) DEVICE — Device: Brand: DEFENDO AIR/WATER/SUCTION AND BIOPSY VALVE

## (undated) DEVICE — VIAL FORMALIN CAP 10P 40ML

## (undated) DEVICE — LINER SURG CANSTR SXN S/RIGD 1500CC

## (undated) DEVICE — THE SINGLE USE ETRAP – POLYP TRAP IS USED FOR SUCTION RETRIEVAL OF ENDOSCOPICALLY REMOVED POLYPS.: Brand: ETRAP

## (undated) DEVICE — ADAPT CLN BIOGUARD AIR/H2O DISP

## (undated) DEVICE — FRCP BX RADJAW4 NDL 2.8 240CM LG OG BX40

## (undated) DEVICE — GLV SURG SENSICARE PI MIC PF SZ8 LF STRL

## (undated) DEVICE — CANNULA,ADULT,SOFT-TOUCH,7'TUBE,UC: Brand: PENDING

## (undated) DEVICE — SYR LL W/SCALE/MARK 3ML STRL

## (undated) DEVICE — SOL IRR H2O BTL 1000ML STRL

## (undated) DEVICE — TUBING, SUCTION, 1/4" X 10', STRAIGHT: Brand: MEDLINE

## (undated) DEVICE — THE TORRENT IRRIGATION SCOPE CONNECTOR IS USED WITH THE TORRENT IRRIGATION TUBING TO PROVIDE IRRIGATION FLUIDS SUCH AS STERILE WATER DURING GASTROINTESTINAL ENDOSCOPIC PROCEDURES WHEN USED IN CONJUNCTION WITH AN IRRIGATION PUMP (OR ELECTROSURGICAL UNIT).: Brand: TORRENT

## (undated) DEVICE — SINGLE-USE BIOPSY FORCEPS: Brand: RADIAL JAW 4

## (undated) DEVICE — KT ORCA ORCAPOD DISP STRL

## (undated) DEVICE — Device

## (undated) DEVICE — BW-412T DISP COMBO CLEANING BRUSH: Brand: SINGLE USE COMBINATION CLEANING BRUSH